# Patient Record
Sex: FEMALE | Race: WHITE | URBAN - METROPOLITAN AREA
[De-identification: names, ages, dates, MRNs, and addresses within clinical notes are randomized per-mention and may not be internally consistent; named-entity substitution may affect disease eponyms.]

---

## 2017-01-14 ENCOUNTER — ED HISTORICAL/CONVERTED ENCOUNTER (OUTPATIENT)
Dept: OTHER | Age: 22
End: 2017-01-14

## 2017-07-13 ENCOUNTER — OP HISTORICAL/CONVERTED ENCOUNTER (OUTPATIENT)
Dept: OTHER | Age: 22
End: 2017-07-13

## 2017-07-17 ENCOUNTER — OP HISTORICAL/CONVERTED ENCOUNTER (OUTPATIENT)
Dept: OTHER | Age: 22
End: 2017-07-17

## 2017-08-08 ENCOUNTER — OP HISTORICAL/CONVERTED ENCOUNTER (OUTPATIENT)
Dept: OTHER | Age: 22
End: 2017-08-08

## 2017-09-07 ENCOUNTER — IP HISTORICAL/CONVERTED ENCOUNTER (OUTPATIENT)
Dept: OTHER | Age: 22
End: 2017-09-07

## 2022-10-24 ENCOUNTER — HOSPITAL ENCOUNTER (EMERGENCY)
Age: 27
Discharge: HOME | End: 2022-10-24
Attending: EMERGENCY MEDICINE
Payer: MEDICAID

## 2022-10-24 ENCOUNTER — APPOINTMENT (OUTPATIENT)
Dept: GENERAL RADIOLOGY | Age: 27
End: 2022-10-24
Attending: EMERGENCY MEDICINE
Payer: MEDICAID

## 2022-10-24 VITALS
HEART RATE: 96 BPM | RESPIRATION RATE: 20 BRPM | TEMPERATURE: 98.4 F | HEIGHT: 66 IN | SYSTOLIC BLOOD PRESSURE: 127 MMHG | OXYGEN SATURATION: 96 % | DIASTOLIC BLOOD PRESSURE: 76 MMHG | BODY MASS INDEX: 38.57 KG/M2 | WEIGHT: 240 LBS

## 2022-10-24 DIAGNOSIS — J20.9 ACUTE BRONCHITIS, UNSPECIFIED ORGANISM: ICD-10-CM

## 2022-10-24 DIAGNOSIS — B34.9 VIRAL SYNDROME: Primary | ICD-10-CM

## 2022-10-24 PROCEDURE — 99283 EMERGENCY DEPT VISIT LOW MDM: CPT

## 2022-10-24 PROCEDURE — 74011250637 HC RX REV CODE- 250/637: Performed by: EMERGENCY MEDICINE

## 2022-10-24 PROCEDURE — 71045 X-RAY EXAM CHEST 1 VIEW: CPT

## 2022-10-24 RX ORDER — ACETAMINOPHEN AND CODEINE PHOSPHATE 300; 30 MG/1; MG/1
1 TABLET ORAL ONCE
Status: COMPLETED | OUTPATIENT
Start: 2022-10-24 | End: 2022-10-24

## 2022-10-24 RX ORDER — ONDANSETRON 4 MG/1
4 TABLET, ORALLY DISINTEGRATING ORAL
Qty: 7 TABLET | Refills: 0 | Status: SHIPPED | OUTPATIENT
Start: 2022-10-24

## 2022-10-24 RX ORDER — CODEINE PHOSPHATE AND GUAIFENESIN 10; 100 MG/5ML; MG/5ML
5 SOLUTION ORAL
Qty: 118 ML | Refills: 0 | Status: SHIPPED | OUTPATIENT
Start: 2022-10-24 | End: 2022-10-27

## 2022-10-24 RX ORDER — ALBUTEROL SULFATE 0.83 MG/ML
SOLUTION RESPIRATORY (INHALATION)
COMMUNITY

## 2022-10-24 RX ORDER — NORETHINDRONE ACETATE/ETHINYL ESTRADIOL AND FERROUS FUMARATE 1MG-20(21)
1 KIT ORAL DAILY
COMMUNITY
Start: 2022-09-01

## 2022-10-24 RX ORDER — ALBUTEROL SULFATE 2.5 MG/.5ML
SOLUTION RESPIRATORY (INHALATION) ONCE
COMMUNITY

## 2022-10-24 RX ADMIN — ACETAMINOPHEN AND CODEINE PHOSPHATE 1 TABLET: 300; 30 TABLET ORAL at 22:25

## 2022-10-25 NOTE — DISCHARGE INSTRUCTIONS
You were seen in the ER for your COVID-like symptoms. You should act as though you are positive even if your swab returns negative. Thankfully, your vital signs are all normal, including your oxygen and your heart rate. You should take tylenol or ibuprofen for fever, aches and pains for the next few days. You can alternate these every 3 hours so that you always have something on board. For instance, you can take tylenol at 9am, ibuprofen at noon, tylenol again at 3pm and ibuprofen again at 6pm. Take in plenty of water to stay hydrated and follow up with your primary care doctor in the next few days. Return to the ER for any uncontrollable vomiting or diarrhea, difficulty breathing, or any other new or concerning symptoms. Thank you! Thank you for allowing me to care for you in the emergency department. It is my goal to provide you with excellent care. If you have not received excellent quality care, please ask to speak to the nurse manager. Please fill out the survey that will come to you by mail or email since we listen to your feedback! Below you will find a list of your tests from today's visit. Should you have any questions, please do not hesitate to call the emergency department. Labs  No results found for this or any previous visit (from the past 12 hour(s)). Radiologic Studies  XR CHEST PORT    (Results Pending)     CT Results  (Last 48 hours)      None          CXR Results  (Last 48 hours)      None          ------------------------------------------------------------------------------------------------------------  The exam and treatment you received in the Emergency Department were for an urgent problem and are not intended as complete care. It is important that you follow-up with a doctor, nurse practitioner, or physician assistant to:  (1) confirm your diagnosis,  (2) re-evaluation of changes in your illness and treatment, and  (3) for ongoing care.  Please take your discharge instructions with you when you go to your follow-up appointment. If you have any problem arranging a follow-up appointment, contact the Emergency Department. If your symptoms become worse or you do not improve as expected and you are unable to reach your health care provider, please return to the Emergency Department. We are available 24 hours a day. If a prescription has been provided, please have it filled as soon as possible to prevent a delay in treatment. If you have any questions or reservations about taking the medication due to side effects or interactions with other medications, please call your primary care provider or contact the ER.

## 2022-10-25 NOTE — ED PROVIDER NOTES
EMERGENCY DEPARTMENT HISTORY AND PHYSICAL EXAM      Date: 10/24/2022  Patient Name: Sb Viera      History of Presenting Illness     Chief Complaint   Patient presents with    Shortness of Breath    Nausea    Rash     facial       History Provided By: Patient    HPI: Sb Viera, 33 y.o. female with a past medical history significant for Asthma/Bronchitis presents to the ED with cc of SOB, N/V/D. Onset yesterday, multiple episodes NBNB emesis, watery diarrhea. This AM noted petechiae to face and coughing, feeling SOB. Denies F/C, no known sick contacts. Denies abd pain. Took negative pregnancy test this AM and is on birth control. There are no other complaints, changes, or physical findings at this time. PCP: No primary care provider on file. Current Outpatient Medications   Medication Sig Dispense Refill    Pam Fe 1/20, 28, 1 mg-20 mcg (21)/75 mg (7) tab Take 1 Tablet by mouth daily. albuterol sulfate (PROVENTIL;VENTOLIN) 2.5 mg/0.5 mL nebu nebulizer solution by Nebulization route once. albuterol (PROVENTIL VENTOLIN) 2.5 mg /3 mL (0.083 %) nebu by Nebulization route. guaiFENesin-codeine (ROBITUSSIN AC) 100-10 mg/5 mL solution Take 5 mL by mouth nightly as needed for Cough for up to 3 days. Max Daily Amount: 5 mL. 118 mL 0    ondansetron (ZOFRAN ODT) 4 mg disintegrating tablet Take 1 Tablet by mouth every eight (8) hours as needed for Nausea or Vomiting. 7 Tablet 0       Past History     Past Medical History:  Past Medical History:   Diagnosis Date    Bronchitis        Past Surgical History:  History reviewed. No pertinent surgical history. Family History:  History reviewed. No pertinent family history.     Social History:  Social History     Tobacco Use    Smoking status: Every Day     Packs/day: 0.25     Types: Cigarettes    Smokeless tobacco: Current   Substance Use Topics    Alcohol use: Yes     Comment: occ    Drug use: Yes     Types: Marijuana     Comment: Daily Allergies: Allergies   Allergen Reactions    Amoxicillin Anaphylaxis    Pcn [Penicillins] Anaphylaxis         Review of Systems   Constitutional: Negative except as in HPI. Eyes: Negative except as in HPI.  ENT: Negative except as in HPI. Cardiovascular: Negative except as in HPI. Respiratory: Negative except as in HPI. Gastrointestinal: Negative except as in HPI. Genitourinary: Negative except as in HPI. Musculoskeletal: Negative except as in HPI. Integumentary: Negative except as in HPI. Neurological: Negative except as in HPI. Psychiatric: Negative except as in HPI. Endocrine: Negative except as in HPI. Hematologic/Lymphatic: Negative except as in HPI. Allergic/Immunologic: Negative except as in HPI. Physical Exam   Constitutional: Awake and alert, interactive, NAD  Eyes: PERRL, no injection or scleral icterus, no discharge  HEENT: NCAT, neck supple, MMM, no oropharyngeal exudates  CV: RRR, no m/r/g  Respiratory: CTAB, no r/r/w  GI: Abd soft, nondistended, nontender  : Deferred  MSK: FROM, no joint effusions or edema  Skin: Petechiae on face  Neuro: CN2-12 intact, symmetric facies, fluent speech. Psych: Well-groomed, normal speech, behavior, appropriate mood    Lab and Diagnostic Study Results     Labs -   No results found for this or any previous visit (from the past 12 hour(s)). Radiologic Studies -   [unfilled]  CT Results  (Last 48 hours)      None          CXR Results  (Last 48 hours)                 10/24/22 2204  XR CHEST PORT Final result    Impression:  Normal chest.       Narrative:  EXAM: XR CHEST PORT       INDICATION: Shortness of breath       COMPARISON: None. FINDINGS: A portable AP radiograph of the chest was obtained at 2200 hours. The   lungs are clear. The cardiac and mediastinal contours and pulmonary vascularity   are normal.  The bones and soft tissues are grossly within normal limits.                     Medical Decision Making and ED Course   - I am the first and primary provider for this patient AND AM THE PRIMARY PROVIDER OF RECORD. - I reviewed the vital signs, available nursing notes, past medical history, past surgical history, family history and social history. - Initial assessment performed. The patients presenting problems have been discussed, and the staff are in agreement with the care plan formulated and outlined with them. I have encouraged them to ask questions as they arise throughout their visit. Vital Signs-Reviewed the patient's vital signs. Patient Vitals for the past 12 hrs:   Temp Pulse Resp BP SpO2   10/24/22 2130 98.4 °F (36.9 °C) 96 20 127/76 96 %       EKG interpretation:         Provider Notes (Medical Decision Making):   27F w/viral syndrome causing gastroenteritis and bronchitis, will give codeine for sleep given cough most prevalent sx currently, zofran PRN for N/V. Diarrhea improving, vitals reassuring. CXR no PNA on my read, will discharge with return precautions. ED Course:       ED Course as of 10/24/22 2225   Mon Oct 24, 2022   2225 XR CHEST PORT  FINDINGS: A portable AP radiograph of the chest was obtained at 2200 hours. The  lungs are clear. The cardiac and mediastinal contours and pulmonary vascularity  are normal.  The bones and soft tissues are grossly within normal limits. IMPRESSION  Normal chest. [YA]      ED Course User Index  [YA] Aakash Mendez MD         Disposition     Disposition: DC- Adult Discharges: All of the diagnostic tests were reviewed and questions answered. Diagnosis, care plan and treatment options were discussed. The patient understands the instructions and will follow up as directed. The patients results have been reviewed with them. They have been counseled regarding their diagnosis.   The patient verbally convey understanding and agreement of the signs, symptoms, diagnosis, treatment and prognosis and additionally agrees to follow up as recommended with their PCP in 24 - 48 hours.  They also agree with the care-plan and convey that all of their questions have been answered. I have also put together some discharge instructions for them that include: 1) educational information regarding their diagnosis, 2) how to care for their diagnosis at home, as well a 3) list of reasons why they would want to return to the ED prior to their follow-up appointment, should their condition change. Discharged      Diagnosis     Clinical Impression:   1. Viral syndrome    2. Acute bronchitis, unspecified organism        Attestations:     Maged Thorpe MD

## 2022-10-25 NOTE — ED TRIAGE NOTES
Patient presents with SOB, nausea. Believes it to be bronchitis. Inhaler and neb used today. No relief.

## 2022-12-14 ENCOUNTER — HOSPITAL ENCOUNTER (EMERGENCY)
Age: 27
Discharge: HOME | End: 2022-12-14
Attending: EMERGENCY MEDICINE | Admitting: EMERGENCY MEDICINE
Payer: MEDICAID

## 2022-12-14 VITALS
WEIGHT: 241 LBS | RESPIRATION RATE: 18 BRPM | SYSTOLIC BLOOD PRESSURE: 121 MMHG | OXYGEN SATURATION: 98 % | TEMPERATURE: 98.2 F | BODY MASS INDEX: 38.73 KG/M2 | HEART RATE: 98 BPM | HEIGHT: 66 IN | DIASTOLIC BLOOD PRESSURE: 78 MMHG

## 2022-12-14 DIAGNOSIS — J42 CHRONIC BRONCHITIS, UNSPECIFIED CHRONIC BRONCHITIS TYPE (HCC): ICD-10-CM

## 2022-12-14 DIAGNOSIS — H92.01 RIGHT EAR PAIN: ICD-10-CM

## 2022-12-14 DIAGNOSIS — R05.3 CHRONIC COUGHING: Primary | ICD-10-CM

## 2022-12-14 PROCEDURE — 99283 EMERGENCY DEPT VISIT LOW MDM: CPT

## 2022-12-14 RX ORDER — METHYLPREDNISOLONE 4 MG/1
TABLET ORAL
Qty: 1 DOSE PACK | Refills: 0 | Status: SHIPPED | OUTPATIENT
Start: 2022-12-14

## 2022-12-14 RX ORDER — ALBUTEROL SULFATE 90 UG/1
2 AEROSOL, METERED RESPIRATORY (INHALATION)
Qty: 1 EACH | Refills: 0 | Status: SHIPPED | OUTPATIENT
Start: 2022-12-14 | End: 2023-01-13

## 2022-12-14 RX ORDER — AZITHROMYCIN 250 MG/1
TABLET, FILM COATED ORAL
Qty: 6 TABLET | Refills: 0 | Status: SHIPPED | OUTPATIENT
Start: 2022-12-14 | End: 2022-12-19

## 2022-12-14 RX ORDER — FLUTICASONE PROPIONATE 50 MCG
2 SPRAY, SUSPENSION (ML) NASAL
Qty: 1 EACH | Refills: 0 | Status: SHIPPED | OUTPATIENT
Start: 2022-12-14

## 2022-12-14 RX ORDER — ALBUTEROL SULFATE 0.83 MG/ML
2.5 SOLUTION RESPIRATORY (INHALATION)
Qty: 1 EACH | Refills: 0 | Status: SHIPPED | OUTPATIENT
Start: 2022-12-14 | End: 2023-01-13

## 2022-12-14 NOTE — ED TRIAGE NOTES
Pt was seen here about a month ago for the same issue, pt states she's still having a cough and believes she has an ear infection due to right ear pain. Pt states she uses an albuterol inhaler with no relief. Pt had a negative covid test on 12/5/22.

## 2022-12-14 NOTE — ED PROVIDER NOTES
EMERGENCY DEPARTMENT HISTORY AND PHYSICAL EXAM      Date: 12/14/2022  Patient Name: Nilesh Stinson    History of Presenting Illness     Chief Complaint   Patient presents with    Cough    Ear Pain       History Provided By: Patient    HPI: Nilesh Stinson, 33 y.o. female presents to the ED with CC of coughing since October. Patient notes cough is worse at night preventing her from sleeping more than several minutes at a stretch. Cough is nonproductive. She denies fevers chills. She does have nasal congestion and some postnasal drip. She denies recent fevers. She had negative COVID test on 12/5/2022. Cough is worse at night. She does cough so hard at times that she throws up mucus. She otherwise denies myalgias, abdominal pain, nausea/vomiting/diarrhea. She denies dysuria, vaginal discharge. She used her albuterol inhaler and nebulizer which extends her periods of cough free times but overall symptoms have not improved. She was seen late October and put on steroids at that time which she believes helped her symptoms. Denies pregnancy. Patient denies SOB, chest pain, or any neurological symptoms. There are no other complaints, changes, or physical findings at this time. Past History     Past Medical History:  Past Medical History:   Diagnosis Date    Bronchitis        Allergies: Allergies   Allergen Reactions    Amoxicillin Anaphylaxis    Pcn [Penicillins] Anaphylaxis       Review of Systems   Vital signs and nursing notes reviewed  Review of Systems   Constitutional:  Negative for activity change, appetite change, fatigue and fever. HENT:  Positive for congestion, postnasal drip and rhinorrhea. Negative for sore throat. Respiratory:  Positive for cough and shortness of breath. Negative for wheezing. Cardiovascular: Negative. Negative for chest pain. Gastrointestinal: Negative. Negative for abdominal pain, diarrhea, nausea and vomiting. Endocrine: Negative.     Genitourinary: Negative for difficulty urinating and dysuria. Musculoskeletal:  Negative for arthralgias, joint swelling and myalgias. Skin: Negative. Negative for rash. Neurological: Negative. Negative for dizziness, weakness, light-headedness and headaches. Psychiatric/Behavioral: Negative. Physical Exam   Visit Vitals  /78 (BP 1 Location: Left upper arm, BP Patient Position: Sitting)   Pulse 98   Temp 98.2 °F (36.8 °C)   Resp 18   Ht 5' 6\" (1.676 m)   Wt 109.3 kg (241 lb)   SpO2 98%   BMI 38.90 kg/m²     CONSTITUTIONAL: Alert, in no distress. Appears stated age. HEAD:  Normocephalic, atraumatic  EYES: EOM intact. No conjunctival injection or scleral icterus  Neck:  Supple. No meningismus  HENT: mild posterior pharynx erythema, nasal congestion, mild left maxillary sinus ttp  RESP: Normal with no work of breathing, speaking in full sentences. CV: Well perfused. NEURO: Alert with normal mentation, moving extremities spontaneously  PSYCH: Normal mood, normal affect      Medical Decision Making   MDM: Pt well appearing with chronic cough which could be allergy related vs chronic bronchitis vs viral syndrome and/or long covid. Pt has not had formal diagnosis of COVID but possible undiagnosed given length of symptoms could be a consideration. Non focal exam will not image today. Will provide steroid dose pack, z pack, and refills on albuterol for nebulizer and an inhaler. Discussed if symptoms persist after New Year's she should have a pulmonary follow-up. ED Course:   Initial assessment performed. The patients presenting problems have been discussed, and they are in agreement with the care plan formulated and outlined with them. I have encouraged them to ask questions as they arise throughout their visit. Critical Care Time: None    Disposition:  DISCHARGE NOTE:  The pt is ready for discharge.  The pt's signs, symptoms, diagnosis, and discharge instructions have been discussed and pt has conveyed their understanding. The pt is to follow up as recommended or return to ER should their symptoms worsen. Plan has been discussed and pt is in agreement. PLAN:  1. Current Discharge Medication List        CONTINUE these medications which have NOT CHANGED    Details   Pam Fe 1/20, 28, 1 mg-20 mcg (21)/75 mg (7) tab Take 1 Tablet by mouth daily. albuterol sulfate (PROVENTIL;VENTOLIN) 2.5 mg/0.5 mL nebu nebulizer solution by Nebulization route once. ondansetron (ZOFRAN ODT) 4 mg disintegrating tablet Take 1 Tablet by mouth every eight (8) hours as needed for Nausea or Vomiting. Qty: 7 Tablet, Refills: 0           2. Follow-up Information       Follow up With Specialties Details [facility] [address] PCP. IF SYMPTOMS PERSIST INTO THE NEW YEAR PLS MAKE AN APPOINTMENT WITH A PULMONOLOGIST              3. Drink plenty of fluids  4. Return to ED if worse especially if any worsening shortness of breath, chest pain or altered mentation. Diagnosis     Clinical Impression:   1. Chronic coughing    2. Right ear pain    3. Chronic bronchitis, unspecified chronic bronchitis type ([address]        Please note that this dictation was completed with [employer], the computer voice recognition software. Quite often unanticipated grammatical, syntax, homophones, and other interpretive errors are inadvertently transcribed by the computer software. Please disregards these errors. Please excuse any errors that have escaped final proofreading.

## 2023-05-25 RX ORDER — ONDANSETRON 4 MG/1
4 TABLET, ORALLY DISINTEGRATING ORAL EVERY 8 HOURS PRN
COMMUNITY
Start: 2022-10-24

## 2023-05-25 RX ORDER — FLUTICASONE PROPIONATE 50 MCG
2 SPRAY, SUSPENSION (ML) NASAL 2 TIMES DAILY PRN
COMMUNITY
Start: 2022-12-14

## 2023-05-25 RX ORDER — NORETHINDRONE ACETATE AND ETHINYL ESTRADIOL 1MG-20(21)
1 KIT ORAL DAILY
COMMUNITY
Start: 2022-09-01

## 2023-05-25 RX ORDER — METHYLPREDNISOLONE 4 MG/1
TABLET ORAL
COMMUNITY
Start: 2022-12-14

## 2024-03-31 ENCOUNTER — HOSPITAL ENCOUNTER (EMERGENCY)
Facility: HOSPITAL | Age: 29
Discharge: HOME | End: 2024-03-31
Payer: MEDICAID

## 2024-03-31 ENCOUNTER — APPOINTMENT (OUTPATIENT)
Facility: HOSPITAL | Age: 29
End: 2024-03-31
Payer: MEDICAID

## 2024-03-31 VITALS
BODY MASS INDEX: 39.37 KG/M2 | DIASTOLIC BLOOD PRESSURE: 58 MMHG | TEMPERATURE: 98.4 F | SYSTOLIC BLOOD PRESSURE: 96 MMHG | OXYGEN SATURATION: 99 % | HEIGHT: 66 IN | RESPIRATION RATE: 14 BRPM | WEIGHT: 245 LBS | HEART RATE: 85 BPM

## 2024-03-31 DIAGNOSIS — O46.90 VAGINAL BLEEDING IN PREGNANCY: Primary | ICD-10-CM

## 2024-03-31 LAB
ABO + RH BLD: NORMAL
ALBUMIN SERPL-MCNC: 3.8 G/DL (ref 3.5–5)
ALBUMIN/GLOB SERPL: 1.1 (ref 1.1–2.2)
ALP SERPL-CCNC: 82 U/L (ref 45–117)
ALT SERPL-CCNC: 23 U/L (ref 12–78)
ANION GAP SERPL CALC-SCNC: 4 MMOL/L (ref 5–15)
APPEARANCE UR: CLEAR
AST SERPL W P-5'-P-CCNC: 12 U/L (ref 15–37)
BACTERIA URNS QL MICRO: NEGATIVE /HPF
BASOPHILS # BLD: 0.1 K/UL (ref 0–0.1)
BASOPHILS NFR BLD: 1 % (ref 0–1)
BILIRUB SERPL-MCNC: 0.5 MG/DL (ref 0.2–1)
BILIRUB UR QL: NEGATIVE
BUN SERPL-MCNC: 10 MG/DL (ref 6–20)
BUN/CREAT SERPL: 15 (ref 12–20)
CA-I BLD-MCNC: 8.8 MG/DL (ref 8.5–10.1)
CHLORIDE SERPL-SCNC: 108 MMOL/L (ref 97–108)
CO2 SERPL-SCNC: 26 MMOL/L (ref 21–32)
COLOR UR: ABNORMAL
CREAT SERPL-MCNC: 0.68 MG/DL (ref 0.55–1.02)
DIFFERENTIAL METHOD BLD: ABNORMAL
EOSINOPHIL # BLD: 0.1 K/UL (ref 0–0.4)
EOSINOPHIL NFR BLD: 1 % (ref 0–7)
EPITH CASTS URNS QL MICRO: ABNORMAL /LPF
ERYTHROCYTE [DISTWIDTH] IN BLOOD BY AUTOMATED COUNT: 12.9 % (ref 11.5–14.5)
GLOBULIN SER CALC-MCNC: 3.5 G/DL (ref 2–4)
GLUCOSE SERPL-MCNC: 94 MG/DL (ref 65–100)
GLUCOSE UR STRIP.AUTO-MCNC: NEGATIVE MG/DL
HCG SERPL-ACNC: 2286 MIU/ML (ref 0–6)
HCG UR QL: POSITIVE
HCT VFR BLD AUTO: 41.9 % (ref 35–47)
HGB BLD-MCNC: 14.2 G/DL (ref 11.5–16)
HGB UR QL STRIP: ABNORMAL
IMM GRANULOCYTES # BLD AUTO: 0.1 K/UL (ref 0–0.04)
IMM GRANULOCYTES NFR BLD AUTO: 1 % (ref 0–0.5)
KETONES UR QL STRIP.AUTO: NEGATIVE MG/DL
LEUKOCYTE ESTERASE UR QL STRIP.AUTO: ABNORMAL
LYMPHOCYTES # BLD: 2.8 K/UL (ref 0.8–3.5)
LYMPHOCYTES NFR BLD: 27 % (ref 12–49)
MCH RBC QN AUTO: 28.1 PG (ref 26–34)
MCHC RBC AUTO-ENTMCNC: 33.9 G/DL (ref 30–36.5)
MCV RBC AUTO: 82.8 FL (ref 80–99)
MONOCYTES # BLD: 0.6 K/UL (ref 0–1)
MONOCYTES NFR BLD: 6 % (ref 5–13)
MUCOUS THREADS URNS QL MICRO: NEGATIVE /LPF
NEUTS SEG # BLD: 6.9 K/UL (ref 1.8–8)
NEUTS SEG NFR BLD: 64 % (ref 32–75)
NITRITE UR QL STRIP.AUTO: NEGATIVE
NRBC # BLD: 0 K/UL (ref 0–0.01)
NRBC BLD-RTO: 0 PER 100 WBC
PH UR STRIP: 5 (ref 5–8)
PLATELET # BLD AUTO: 298 K/UL (ref 150–400)
PMV BLD AUTO: 9.6 FL (ref 8.9–12.9)
POTASSIUM SERPL-SCNC: 3.8 MMOL/L (ref 3.5–5.1)
PROT SERPL-MCNC: 7.3 G/DL (ref 6.4–8.2)
PROT UR STRIP-MCNC: NEGATIVE MG/DL
RBC # BLD AUTO: 5.06 M/UL (ref 3.8–5.2)
RBC #/AREA URNS HPF: ABNORMAL /HPF (ref 0–5)
SODIUM SERPL-SCNC: 138 MMOL/L (ref 136–145)
SP GR UR REFRACTOMETRY: 1.02 (ref 1–1.03)
URINE CULTURE IF INDICATED: ABNORMAL
UROBILINOGEN UR QL STRIP.AUTO: 0.1 EU/DL (ref 0.1–1)
WBC # BLD AUTO: 10.5 K/UL (ref 3.6–11)
WBC URNS QL MICRO: ABNORMAL /HPF (ref 0–4)

## 2024-03-31 PROCEDURE — 6360000002 HC RX W HCPCS: Performed by: NURSE PRACTITIONER

## 2024-03-31 PROCEDURE — 96372 THER/PROPH/DIAG INJ SC/IM: CPT

## 2024-03-31 PROCEDURE — 81001 URINALYSIS AUTO W/SCOPE: CPT

## 2024-03-31 PROCEDURE — 80053 COMPREHEN METABOLIC PANEL: CPT

## 2024-03-31 PROCEDURE — 84702 CHORIONIC GONADOTROPIN TEST: CPT

## 2024-03-31 PROCEDURE — 81025 URINE PREGNANCY TEST: CPT

## 2024-03-31 PROCEDURE — 86901 BLOOD TYPING SEROLOGIC RH(D): CPT

## 2024-03-31 PROCEDURE — 76817 TRANSVAGINAL US OBSTETRIC: CPT

## 2024-03-31 PROCEDURE — 85025 COMPLETE CBC W/AUTO DIFF WBC: CPT

## 2024-03-31 PROCEDURE — 99284 EMERGENCY DEPT VISIT MOD MDM: CPT

## 2024-03-31 PROCEDURE — 86900 BLOOD TYPING SEROLOGIC ABO: CPT

## 2024-03-31 PROCEDURE — 36415 COLL VENOUS BLD VENIPUNCTURE: CPT

## 2024-03-31 RX ADMIN — HUMAN RHO(D) IMMUNE GLOBULIN 300 MCG: 1500 SOLUTION INTRAMUSCULAR; INTRAVENOUS at 17:32

## 2024-03-31 ASSESSMENT — PAIN SCALES - GENERAL
PAINLEVEL_OUTOF10: 0
PAINLEVEL_OUTOF10: 0

## 2024-03-31 ASSESSMENT — PAIN - FUNCTIONAL ASSESSMENT: PAIN_FUNCTIONAL_ASSESSMENT: 0-10

## 2024-03-31 ASSESSMENT — LIFESTYLE VARIABLES
HOW OFTEN DO YOU HAVE A DRINK CONTAINING ALCOHOL: NEVER
HOW MANY STANDARD DRINKS CONTAINING ALCOHOL DO YOU HAVE ON A TYPICAL DAY: PATIENT DOES NOT DRINK

## 2024-03-31 NOTE — ED PROVIDER NOTES
WALESKA Jaime - NP (electronically signed)    (Please note that parts of this dictation were completed with voice recognition software. Quite often unanticipated grammatical, syntax, homophones, and other interpretive errors are inadvertently transcribed by the computer software. Please disregards these errors. Please excuse any errors that have escaped final proofreading.)     Catarino Wright, WALESKA - NP  04/01/24 [id number]

## 2024-04-01 LAB
BLD PROD TYP BPU: NORMAL
BLOOD BANK BLOOD PRODUCT EXPIRATION DATE: NORMAL
BLOOD BANK DISPENSE STATUS: NORMAL
BPU ID: NORMAL
TRANSFUSION STATUS PATIENT QL: NORMAL
UNIT DIVISION: 0
UNIT ISSUE DATE/TIME: NORMAL

## 2024-04-17 LAB
ABO, EXTERNAL RESULT: NORMAL
C. TRACHOMATIS, EXTERNAL RESULT: NEGATIVE
HEP B, EXTERNAL RESULT: NEGATIVE
HIV, EXTERNAL RESULT: NEGATIVE
N. GONORRHOEAE, EXTERNAL RESULT: NEGATIVE
RH FACTOR, EXTERNAL RESULT: NEGATIVE
RPR, EXTERNAL RESULT: NON REACTIVE
RUBELLA TITER, EXTERNAL RESULT: NORMAL

## 2024-07-18 ENCOUNTER — TELEPHONE (OUTPATIENT)
Age: 29
End: 2024-07-18

## 2024-07-18 NOTE — TELEPHONE ENCOUNTER
Spoke w/ patient and confirmed name and     Scheduled patient per referral for 24 @ 1:00 [facility]    Advised patient to arrive 10 min early to fill out paperwork, bring insurance card and photo ID. Informed patient that she may bring two guest over the age of 12.

## 2024-07-30 ENCOUNTER — ROUTINE PRENATAL (OUTPATIENT)
Age: 29
End: 2024-07-30
Payer: MEDICAID

## 2024-07-30 VITALS — HEART RATE: 99 BPM | SYSTOLIC BLOOD PRESSURE: 119 MMHG | DIASTOLIC BLOOD PRESSURE: 79 MMHG

## 2024-07-30 DIAGNOSIS — O99.210 OBESITY IN PREGNANCY: Primary | ICD-10-CM

## 2024-07-30 PROCEDURE — 76811 OB US DETAILED SNGL FETUS: CPT | Performed by: OBSTETRICS & GYNECOLOGY

## 2024-07-30 PROCEDURE — 99203 OFFICE O/P NEW LOW 30 MIN: CPT | Performed by: OBSTETRICS & GYNECOLOGY

## 2024-07-30 NOTE — PROCEDURES
PATIENT: CALIXTO CABEZAS   -  : 1995   -  DOS:2024   -  INTERPRETING PROVIDER:Zohra Galvan,   Indication  ========    Obesity in pregnancy    Method  ======    Transabdominal ultrasound examination. View: Suboptimal view: limited by maternal body habitus    Dating  ======    LMP on: 2024  GA by LMP 25 w + 6 d  CHARLENE by LMP: 2024  Previous Ultrasound on: 2024  Type of prior assessment: GA  GA at prior assessment date 7 w + 1 d  GA by previous U/S 22 w + 0 d  CHARLENE by previous Ultrasound: 12/3/2024  Ultrasound examination on: 2024  GA by U/S based upon: AC, BPD, Femur, HC  GA by U/S 22 w + 0 d  CHARLENE by U/S: 12/3/2024  Assigned: based on ultrasound (GA), selected on 2024  Assigned GA 22 w + 0 d  Assigned CHARLENE: 12/3/2024    Fetal Growth Overview  =================    Exam date        GA              BPD (mm)          HC (mm)              AC (mm)               FL (mm)             HL (mm)            EFW (g)  2024        22w 0d        51.1     27%        190.3    14%        175.4     57%        39.4    63%        38.7     94%        500    64%    Fetal Biometry  ============    Standard  BPD 51.1 mm 21w 3d 27% Hadlock  OFD 68.0 mm 22w 5d 76% Sterling  .3 mm 21w 2d 14% Hadlock  Cerebellum tr 24.6 mm 22w 3d 86% Hill  .4 mm 22w 3d 57% Hadlock  Femur 39.4 mm 22w 5d 63% Hadlock  Humerus 38.7 mm 23w 5d 94% Sterling   g 22w 2d 64% Hadlock  EFW (lb) 1 lb  EFW (oz) 2 oz  EFW by: Hadlock (BPD-HC-AC-FL)  Extended   4.3 mm  CM 4.4 mm  19% Nicolaides  Head / Face / Neck  Nasal bone: present  Other Structures   bpm    General Evaluation  ==============    Cardiac activity present.  bpm. Fetal movements: visualized. Presentation: Cephalic  Placenta: Placental site: posterior, appropriate distance from the internal os. Placental edge-to-cervical os distance 4.5 cm  Umbilical cord: Cord vessels: 3 vessel cord. Insertion site: central  Amniotic fluid: Amount of AF:

## 2024-08-28 ENCOUNTER — ROUTINE PRENATAL (OUTPATIENT)
Age: 29
End: 2024-08-28
Payer: MEDICAID

## 2024-08-28 VITALS — DIASTOLIC BLOOD PRESSURE: 77 MMHG | SYSTOLIC BLOOD PRESSURE: 125 MMHG | HEART RATE: 106 BPM

## 2024-08-28 DIAGNOSIS — O99.210 OBESITY IN PREGNANCY: Primary | ICD-10-CM

## 2024-08-28 PROCEDURE — 99212 OFFICE O/P EST SF 10 MIN: CPT | Performed by: OBSTETRICS & GYNECOLOGY

## 2024-08-28 PROCEDURE — 76816 OB US FOLLOW-UP PER FETUS: CPT | Performed by: OBSTETRICS & GYNECOLOGY

## 2024-08-29 NOTE — PROCEDURES
PATIENT: CALIXTO CABEZAS   -  : 1995   -  DOS:2024   -  INTERPRETING PROVIDER:Zohra Galvan,   Indication  ========    Obesity in pregnancy    Method  ======    Transabdominal ultrasound examination. View: Suboptimal view: limited by maternal body habitus    Pregnancy  =========    Aleman pregnancy. Number of fetuses: 1    Dating  ======    LMP on: 2024  GA by LMP 30 w + 0 d  CHARLENE by LMP: 2024  Previous Ultrasound on: 2024  Type of prior assessment: GA  GA at prior assessment date 7 w + 1 d  GA by previous U/S 26 w + 1 d  CHARLENE by previous Ultrasound: 12/3/2024  Ultrasound examination on: 2024  GA by U/S based upon: AC, BPD, Femur, HC  GA by U/S 25 w + 6 d  CHARLENE by U/S: 2024  Assigned: based on ultrasound (GA), selected on 2024  Assigned GA 26 w + 1 d  Assigned CHARLENE: 12/3/2024    Fetal Biometry  ============    Standard  BPD 65.2 mm 26w 2d 46% Hadlock  OFD 81.7 mm 26w 4d 62% Sterling  .4 mm 25w 4d 11% Hadlock  Cerebellum tr 28.7 mm 25w 2d 26% Hill  .5 mm 25w 4d 25% Hadlock  Femur 47.2 mm 25w 5d 24% Hadlock   g 25w 3d 23% Hadlock  EFW (lb) 1 lb  EFW (oz) 14 oz  EFW by: Hadlock (BPD-HC-AC-FL)  Extended  Nasal bone 8.8 mm  Head / Face / Neck  Nasal bone: present  Other Structures   bpm    General Evaluation  ==============    Cardiac activity present.  bpm. Fetal movements: visualized. Presentation: Cephalic  Placenta: Placental site: posterior, appropriate distance from the internal os  Umbilical cord: Cord vessels: 3 vessel cord. Insertion site: central  Amniotic fluid: Amount of AF: normal    Fetal Anatomy  ===========    Cranium: normal  Cavum septi pellucidi: normal  Cerebellum: normal  Profile: normal  Face  Nasal bone: present  4-chamber view: SUBOPTIMAL  RVOT view: normal  LVOT view: NOT VISUALIZED  3-vessel view: NOT VISUALIZED  3-vessel-trachea view: NOT VISUALIZED  Heart / Thorax  Situs: situs solitus (normal)  Aortic arch

## 2024-09-06 ENCOUNTER — HOSPITAL ENCOUNTER (OUTPATIENT)
Facility: HOSPITAL | Age: 29
Setting detail: OBSERVATION
Discharge: HOME | End: 2024-09-07
Attending: STUDENT IN AN ORGANIZED HEALTH CARE EDUCATION/TRAINING PROGRAM | Admitting: OBSTETRICS & GYNECOLOGY
Payer: MEDICAID

## 2024-09-06 ENCOUNTER — HOSPITAL ENCOUNTER (OUTPATIENT)
Facility: HOSPITAL | Age: 29
Discharge: HOME | End: 2024-09-06
Attending: OBSTETRICS & GYNECOLOGY | Admitting: OBSTETRICS & GYNECOLOGY
Payer: MEDICAID

## 2024-09-06 VITALS
HEIGHT: 66 IN | WEIGHT: 251 LBS | TEMPERATURE: 98 F | BODY MASS INDEX: 40.34 KG/M2 | HEART RATE: 102 BPM | SYSTOLIC BLOOD PRESSURE: 117 MMHG | DIASTOLIC BLOOD PRESSURE: 67 MMHG | OXYGEN SATURATION: 99 % | RESPIRATION RATE: 20 BRPM

## 2024-09-06 PROBLEM — R10.11 RIGHT UPPER QUADRANT PAIN: Status: ACTIVE | Noted: 2024-09-06

## 2024-09-06 PROBLEM — Z3A.27 27 WEEKS GESTATION OF PREGNANCY: Status: ACTIVE | Noted: 2024-09-06

## 2024-09-06 LAB
ALBUMIN SERPL-MCNC: 2.9 G/DL (ref 3.5–5)
ALBUMIN/GLOB SERPL: 0.9 (ref 1.1–2.2)
ALP SERPL-CCNC: 96 U/L (ref 45–117)
ALT SERPL-CCNC: 16 U/L (ref 12–78)
ANION GAP SERPL CALC-SCNC: 9 MMOL/L (ref 2–12)
APPEARANCE UR: CLEAR
AST SERPL-CCNC: 13 U/L (ref 15–37)
BACTERIA URNS QL MICRO: NEGATIVE /HPF
BASOPHILS # BLD: 0 K/UL (ref 0–0.1)
BASOPHILS NFR BLD: 0 % (ref 0–1)
BILIRUB SERPL-MCNC: 0.5 MG/DL (ref 0.2–1)
BILIRUB UR QL: NEGATIVE
BUN SERPL-MCNC: 5 MG/DL (ref 6–20)
BUN/CREAT SERPL: 11 (ref 12–20)
CALCIUM SERPL-MCNC: 8.4 MG/DL (ref 8.5–10.1)
CHLORIDE SERPL-SCNC: 107 MMOL/L (ref 97–108)
CO2 SERPL-SCNC: 21 MMOL/L (ref 21–32)
COLOR UR: ABNORMAL
CREAT SERPL-MCNC: 0.44 MG/DL (ref 0.55–1.02)
CREAT UR-MCNC: 200 MG/DL
DIFFERENTIAL METHOD BLD: ABNORMAL
EOSINOPHIL # BLD: 0 K/UL (ref 0–0.4)
EOSINOPHIL NFR BLD: 0 % (ref 0–7)
EPITH CASTS URNS QL MICRO: ABNORMAL /LPF
ERYTHROCYTE [DISTWIDTH] IN BLOOD BY AUTOMATED COUNT: 13.2 % (ref 11.5–14.5)
GLOBULIN SER CALC-MCNC: 3.3 G/DL (ref 2–4)
GLUCOSE SERPL-MCNC: 98 MG/DL (ref 65–100)
GLUCOSE UR STRIP.AUTO-MCNC: NEGATIVE MG/DL
HCT VFR BLD AUTO: 36.1 % (ref 35–47)
HGB BLD-MCNC: 12.4 G/DL (ref 11.5–16)
HGB UR QL STRIP: NEGATIVE
IMM GRANULOCYTES # BLD AUTO: 0.1 K/UL (ref 0–0.04)
IMM GRANULOCYTES NFR BLD AUTO: 1 % (ref 0–0.5)
KETONES UR QL STRIP.AUTO: NEGATIVE MG/DL
LEUKOCYTE ESTERASE UR QL STRIP.AUTO: NEGATIVE
LIPASE SERPL-CCNC: 26 U/L (ref 13–75)
LYMPHOCYTES # BLD: 0.7 K/UL (ref 0.8–3.5)
LYMPHOCYTES NFR BLD: 5 % (ref 12–49)
MCH RBC QN AUTO: 28.8 PG (ref 26–34)
MCHC RBC AUTO-ENTMCNC: 34.3 G/DL (ref 30–36.5)
MCV RBC AUTO: 83.8 FL (ref 80–99)
MONOCYTES # BLD: 0.6 K/UL (ref 0–1)
MONOCYTES NFR BLD: 4 % (ref 5–13)
NEUTS SEG # BLD: 13.4 K/UL (ref 1.8–8)
NEUTS SEG NFR BLD: 90 % (ref 32–75)
NITRITE UR QL STRIP.AUTO: NEGATIVE
NRBC # BLD: 0 K/UL (ref 0–0.01)
NRBC BLD-RTO: 0 PER 100 WBC
PH UR STRIP: 8 (ref 5–8)
PLATELET # BLD AUTO: 256 K/UL (ref 150–400)
PMV BLD AUTO: 9.9 FL (ref 8.9–12.9)
POTASSIUM SERPL-SCNC: 3.6 MMOL/L (ref 3.5–5.1)
PROT SERPL-MCNC: 6.2 G/DL (ref 6.4–8.2)
PROT UR STRIP-MCNC: NEGATIVE MG/DL
PROT UR-MCNC: 36 MG/DL (ref 0–11.9)
PROT/CREAT UR-RTO: 0.2
RBC # BLD AUTO: 4.31 M/UL (ref 3.8–5.2)
RBC #/AREA URNS HPF: ABNORMAL /HPF (ref 0–5)
RBC MORPH BLD: ABNORMAL
SODIUM SERPL-SCNC: 137 MMOL/L (ref 136–145)
SP GR UR REFRACTOMETRY: 1.01 (ref 1–1.03)
URINE CULTURE IF INDICATED: ABNORMAL
UROBILINOGEN UR QL STRIP.AUTO: 0.1 EU/DL (ref 0.1–1)
WBC # BLD AUTO: 14.8 K/UL (ref 3.6–11)
WBC URNS QL MICRO: ABNORMAL /HPF (ref 0–4)

## 2024-09-06 PROCEDURE — 36415 COLL VENOUS BLD VENIPUNCTURE: CPT

## 2024-09-06 PROCEDURE — 85025 COMPLETE CBC W/AUTO DIFF WBC: CPT

## 2024-09-06 PROCEDURE — 84156 ASSAY OF PROTEIN URINE: CPT

## 2024-09-06 PROCEDURE — 96361 HYDRATE IV INFUSION ADD-ON: CPT

## 2024-09-06 PROCEDURE — 6370000000 HC RX 637 (ALT 250 FOR IP): Performed by: OBSTETRICS & GYNECOLOGY

## 2024-09-06 PROCEDURE — 82570 ASSAY OF URINE CREATININE: CPT

## 2024-09-06 PROCEDURE — 4500000002 HC ER NO CHARGE

## 2024-09-06 PROCEDURE — 96374 THER/PROPH/DIAG INJ IV PUSH: CPT

## 2024-09-06 PROCEDURE — 6360000002 HC RX W HCPCS: Performed by: OBSTETRICS & GYNECOLOGY

## 2024-09-06 PROCEDURE — G0379 DIRECT REFER HOSPITAL OBSERV: HCPCS

## 2024-09-06 PROCEDURE — 83690 ASSAY OF LIPASE: CPT

## 2024-09-06 PROCEDURE — 80053 COMPREHEN METABOLIC PANEL: CPT

## 2024-09-06 PROCEDURE — G0378 HOSPITAL OBSERVATION PER HR: HCPCS

## 2024-09-06 PROCEDURE — 81001 URINALYSIS AUTO W/SCOPE: CPT

## 2024-09-06 PROCEDURE — 2580000003 HC RX 258: Performed by: OBSTETRICS & GYNECOLOGY

## 2024-09-06 PROCEDURE — 99214 OFFICE O/P EST MOD 30 MIN: CPT

## 2024-09-06 RX ORDER — ONDANSETRON 2 MG/ML
4 INJECTION INTRAMUSCULAR; INTRAVENOUS EVERY 6 HOURS PRN
Status: DISCONTINUED | OUTPATIENT
Start: 2024-09-06 | End: 2024-09-07 | Stop reason: HOSPADM

## 2024-09-06 RX ORDER — SODIUM CHLORIDE, SODIUM LACTATE, POTASSIUM CHLORIDE, CALCIUM CHLORIDE 600; 310; 30; 20 MG/100ML; MG/100ML; MG/100ML; MG/100ML
INJECTION, SOLUTION INTRAVENOUS CONTINUOUS
Status: DISCONTINUED | OUTPATIENT
Start: 2024-09-06 | End: 2024-09-07 | Stop reason: HOSPADM

## 2024-09-06 RX ORDER — ONDANSETRON 4 MG/1
4 TABLET, ORALLY DISINTEGRATING ORAL EVERY 8 HOURS PRN
Status: DISCONTINUED | OUTPATIENT
Start: 2024-09-06 | End: 2024-09-07 | Stop reason: HOSPADM

## 2024-09-06 RX ORDER — OXYCODONE HYDROCHLORIDE 5 MG/1
10 TABLET ORAL EVERY 4 HOURS PRN
Status: DISCONTINUED | OUTPATIENT
Start: 2024-09-06 | End: 2024-09-07 | Stop reason: HOSPADM

## 2024-09-06 RX ORDER — MORPHINE SULFATE 4 MG/ML
4 INJECTION, SOLUTION INTRAMUSCULAR; INTRAVENOUS
Status: DISCONTINUED | OUTPATIENT
Start: 2024-09-06 | End: 2024-09-07 | Stop reason: HOSPADM

## 2024-09-06 RX ORDER — ACETAMINOPHEN 500 MG
1000 TABLET ORAL EVERY 8 HOURS PRN
Status: DISCONTINUED | OUTPATIENT
Start: 2024-09-06 | End: 2024-09-07 | Stop reason: HOSPADM

## 2024-09-06 RX ORDER — SODIUM CHLORIDE, SODIUM LACTATE, POTASSIUM CHLORIDE, AND CALCIUM CHLORIDE .6; .31; .03; .02 G/100ML; G/100ML; G/100ML; G/100ML
1000 INJECTION, SOLUTION INTRAVENOUS ONCE
Status: COMPLETED | OUTPATIENT
Start: 2024-09-06 | End: 2024-09-06

## 2024-09-06 RX ORDER — OXYCODONE HYDROCHLORIDE 5 MG/1
5 TABLET ORAL EVERY 4 HOURS PRN
Status: DISCONTINUED | OUTPATIENT
Start: 2024-09-06 | End: 2024-09-07 | Stop reason: HOSPADM

## 2024-09-06 RX ORDER — MORPHINE SULFATE 4 MG/ML
2 INJECTION, SOLUTION INTRAMUSCULAR; INTRAVENOUS
Status: DISCONTINUED | OUTPATIENT
Start: 2024-09-06 | End: 2024-09-07 | Stop reason: HOSPADM

## 2024-09-06 RX ADMIN — SODIUM CHLORIDE, POTASSIUM CHLORIDE, SODIUM LACTATE AND CALCIUM CHLORIDE: 600; 310; 30; 20 INJECTION, SOLUTION INTRAVENOUS at 23:21

## 2024-09-06 RX ADMIN — SODIUM CHLORIDE, POTASSIUM CHLORIDE, SODIUM LACTATE AND CALCIUM CHLORIDE 1000 ML: 600; 310; 30; 20 INJECTION, SOLUTION INTRAVENOUS at 22:34

## 2024-09-06 RX ADMIN — OXYCODONE HYDROCHLORIDE 5 MG: 5 TABLET ORAL at 23:52

## 2024-09-06 RX ADMIN — ONDANSETRON 4 MG: 2 INJECTION INTRAMUSCULAR; INTRAVENOUS at 23:25

## 2024-09-06 ASSESSMENT — PAIN SCALES - GENERAL: PAINLEVEL_OUTOF10: 5

## 2024-09-06 ASSESSMENT — PAIN DESCRIPTION - LOCATION: LOCATION: ABDOMEN

## 2024-09-06 ASSESSMENT — PAIN DESCRIPTION - ORIENTATION: ORIENTATION: RIGHT

## 2024-09-06 ASSESSMENT — PAIN DESCRIPTION - DESCRIPTORS: DESCRIPTORS: TIGHTNESS

## 2024-09-06 NOTE — PROGRESS NOTES
4784- Pt arrived to L&D in wheelchair w/ FOB. Pt c/o abdominal pain that began yesterday after a vomiting episode. Pt states she has been experiencing n&v off and on throughout the pregnancy. Pt states the pain is constant, but gets worse w/ movement. Pt states pain is so bad that she was unable to sleep well last night. Pt feeling baby move, but not as much as usual. Pt denies any leaking of fluid or vaginal bleeding. Pt reports drinking approx 3 bottles of water yesterday. Pt escorted to bathroom and instructed to change into pt gown and leave urine sample.    1021- Pt into bed and hooked up to EFM.    1120- Dr. Allen at bedside to see pt. MD informed pt that it sounds like round ligament pain and instructed pt to try warm compresses and tylenol at home. EFM discontinued.    1136- Discharge instructions reviewed w/ pt and FOB, no questions at this time. Pt ambulatory off unit.

## 2024-09-06 NOTE — PROGRESS NOTES
9/6/2024        RE: Bindu Tarango         [address] [facility] [address]          To Whom It May Concern,      Due to medical reasons, Bindu Sukhdeep   may return to work on 9/7/2024        Sincerely,          Penny Steward RN

## 2024-09-06 NOTE — ED TRIAGE NOTES
27 weeks pregnant presents with  lower abdominal pain that started yesterday , ob [place]. N/V yesterday. No cp or sob. Spoke with papi in l&d pt to room 6.  
Rate:   Fetal heart rate is the 120-140 beat per minute range appropriate for gestational age.    Non-Stress Test:   An NST was performed on this patient which I personally reviewed and interpreted.  Indication(s) for testing are as listed above.     Duration of Testin minutes    FHTs:  Baseline BPM:  Variability: Moderate  Accelerations: Present 15x15    Decelerations: Absent  Category: 1  Reactivity:  REACTIVE    CONTRACTIONS:  Monitored by:  TOCO  Frequency:  q  Minutes None  Duration:   n/a Seconds  Intensitiy:  n/a  Mild/ Moderate/Severe  Resting Tone:  Relaxed      Uterine activity/Contractions:   Monitored with [place].  q  minutes    General Appearance:     No acute distress.  Oriented to person, place, and time.       Able to easily express reason for presentation, symptoms       and concerns.    HEENT:    CV:  Lungs:     Normocephalic, atraumatic, pupils equal and round,      EOMI.     Regular rate, regular rhythym.     Non-labored breathing, symetrical chest rise, no            accessory muscle use.    Abdomen:     Soft, non-tender, NABS, no HSM, no masses.  Fundus      appropriate size for gestational age and nontender.  Mild fundal tenderness c/w round ligament pain symptoms      EFW: Less than 4500g           Pelvic:   Perineum: dry  Pelvic not indicated        Extremities:    No C/C/E, intact distal pulses, no cords.  No significant        lower extremity edema   Skin:    Skin color, texture, turgor normal, no rashes, lesions,        abrasions or lacerations.    Neurologic:    Psych:    Appears generally intact. Moves all four extremities, no       dysarthria, cranial nerves II-XII grossly intact,      Normal mood. Appropriate. Alert and oriented x 3.       Labs:   Recent Results (from the past 24 hour(s))   Urinalysis with Reflex to Culture    Collection Time: 24 10:15 AM    Specimen: Urine   Result Value Ref Range    Color, UA Yellow/Straw      Appearance Clear Clear      Specific Gravity,

## 2024-09-07 ENCOUNTER — HOSPITAL ENCOUNTER (OUTPATIENT)
Facility: HOSPITAL | Age: 29
Setting detail: OBSERVATION
Discharge: HOME | End: 2024-09-10
Payer: MEDICAID

## 2024-09-07 VITALS
SYSTOLIC BLOOD PRESSURE: 103 MMHG | HEART RATE: 94 BPM | RESPIRATION RATE: 16 BRPM | DIASTOLIC BLOOD PRESSURE: 56 MMHG | OXYGEN SATURATION: 98 % | TEMPERATURE: 98.6 F

## 2024-09-07 PROCEDURE — G0378 HOSPITAL OBSERVATION PER HR: HCPCS

## 2024-09-07 PROCEDURE — 2580000003 HC RX 258: Performed by: OBSTETRICS & GYNECOLOGY

## 2024-09-07 PROCEDURE — 76705 ECHO EXAM OF ABDOMEN: CPT

## 2024-09-07 PROCEDURE — 94761 N-INVAS EAR/PLS OXIMETRY MLT: CPT

## 2024-09-07 PROCEDURE — 96361 HYDRATE IV INFUSION ADD-ON: CPT

## 2024-09-07 RX ADMIN — SODIUM CHLORIDE, POTASSIUM CHLORIDE, SODIUM LACTATE AND CALCIUM CHLORIDE 125 ML/HR: 600; 310; 30; 20 INJECTION, SOLUTION INTRAVENOUS at 05:47

## 2024-09-07 NOTE — PROGRESS NOTES
9/7/2024        RE: Bindu Tarango         [address] [facility] [address]          To Whom It May Concern,      Due to medical reasons, Bindu Tarango may  may return to work on 9/10/24.        Sincerely,          NABIL ALLEN RN /   on behalf of Dr. JEFFERY Jones MD

## 2024-09-07 NOTE — PROGRESS NOTES
Progress Note    Bindu Tarango  [id number]  1995   27w4d      S:  Feeling much improved. RUQ pain has resolved completely. She denies nausea, vomiting, abdominal pain. +GFM.    O:    BP (!) 95/56   Pulse (!) 102   Temp 98.6 °F (37 °C) (Oral)   Resp 16   LMP 2024 (Approximate)   SpO2 98%        A/P:  28 y.o.  @ 27w4d - RUQ pain  -RUQ sono unremarkable  -Labs are unremarkable, no leukocytosis, LFTs and lipase normal  -Bps wnl  -Fetal status reassuring  -Pain has now resolved. Patient tolerating regular diet. Ok for discharge home. Precautions reviewed  -NST prior to discharge    Millicent Jones MD  [facility]

## 2024-09-07 NOTE — PROGRESS NOTES
2122: Patient presented to L&D with c/o abdominal pain in RUQ and intermittent nausea. States she has vomited approx. 6 times today. Was a ED earlier today for same complaint. States they told her is was round ligament pain. Patient states she had elevated Bps while at hospital. Worried about preE.   2254: Dr. Tarango at bedside discussing POC.   0011: Dr. Tarango reviewing lab results and FHR tracing. Okay for patient to come off monitor. Plan for patient to be monitored for 30 min qshift.   0340: Bedside shift change report given to CALIN Madrid (oncoming nurse) by CALIN Valdes (offgoing nurse). Report included the following information Nurse Handoff Report, Index, Intake/Output, MAR, and Recent Results.

## 2024-09-07 NOTE — DISCHARGE INSTRUCTIONS
There is no sure way to prevent labor before your due date ( labor) or to prevent most other pregnancy problems. But there are things you can do to increase your chances of a healthy pregnancy. Go to your appointments, follow your doctor's advice, and take good care of yourself. Eat healthy foods, and exercise (if your doctor agrees). And make sure to drink plenty of water.  Follow-up care is a key part of your treatment and safety. Be sure to make and go to all appointments, and call your doctor if you are having problems. It's also a good idea to know your test results and keep a list of the medicines you take.  How can you care for yourself at home?  Make sure you go to your prenatal appointments. At each visit, your doctor will check your blood pressure and weight. Your doctor will also listen for a fetal heartbeat and measure the size of the uterus.  Drink plenty of fluids. Dehydration can cause contractions. If you have kidney, heart, or liver disease and have to limit fluids, talk with your doctor before you increase the amount of fluids you drink.  Tell your doctor right away if you notice any symptoms of an infection, such as:  Burning when you urinate.  A frequent need to urinate without being able to pass much urine.  A foul-smelling discharge from your vagina.  Vaginal itching.  Unexplained fever.  Unusual pain or soreness in your uterus or lower belly.  Avoid foods that may be harmful.  Don't eat raw meat, deli meat, raw seafood, or raw eggs.  Avoid soft cheese and unpasteurized dairy, like Brie and blue cheese.  Avoid fish that are high in mercury. These include shark, swordfish, kodak mackerel, marlin, orange roughy, and bigeye tuna, as well as tilefish from the [place].  If you smoke or vape, quit or cut back as much as you can. Talk to your doctor if you need help quitting.  If you use alcohol, marijuana, or other drugs, quit or cut back as much as you can. It's safest not to use them  seeing spots).  A severe headache.     You have symptoms of a blood clot in your leg (called a deep vein thrombosis), such as:  Pain in the calf, back of the knee, thigh, or groin.  Swelling in the leg or groin.  A color change on the leg or groin. The skin may be reddish or purplish, depending on your usual skin color.     You have any vaginal bleeding.     You have belly pain or cramping.     You have a fever.     You've been having regular contractions for an hour. This means that you've had at least 6 contractions within 1 hour, even after you change your position and drink fluids.     You have symptoms of a urinary tract infection. These may include:  Pain or burning when you urinate.  A frequent need to urinate without being able to pass much urine.  Pain in your low back (below the rib cage and above the waist).  Blood in your urine.     You have a sudden release of fluid from your vagina.     You have low back pain or pelvic pressure that does not go away.     You notice that your baby has stopped moving or is moving less than normal.   Watch closely for changes in your health, and be sure to contact your doctor if:    You have vaginal discharge that smells bad.     You feel sad, anxious, or hopeless for more than a few days.     You have other concerns about your pregnancy.   Where can you learn more?  Go to https://www.[employer].net/patientEd and enter Y951 to learn more about \"Pregnancy Precautions: Care Instructions.\"  Current as of: July 10, 2023  Content Version: 14.1  © 5766-3361 [employer].   Care instructions adapted under license by [employer]. If you have questions about a medical condition or this instruction, always ask your healthcare professional. [employer] disclaims any warranty or liability for your use of this information.

## 2024-09-07 NOTE — PROGRESS NOTES
0345: SBAR report received from FREDDIE Helton RN. Care of patient assumed at this time.    0409: Patient denies pain and reports feeling, \"much better\" at this time.    0700: Bedside and Verbal shift change report given to Jelani Daniels RN (oncoming nurse) by CALIN Mahmood (offgoing nurse). Report included the following information Nurse Handoff Report, MAR, Recent Results, and Quality Measures.

## 2024-09-07 NOTE — PROGRESS NOTES
0700: SBAR report received from YAQUELIN Mahmood RN    0806: Pt doing well at this time. Pt denies any pain or nausea/vomiting. Pt states the pain that brought her in last night has gone away. Pt reports being hungry. Pt advised we are awaiting the Ultrasound to be performed prior to her being able to eat. Pt verbalized understanding.     0901: Ultrasound at bedside. EFM removed for purpose of abdominal Ultrasound to be performed.     1230: Dr. Jones informed pt is reporting feeling very hungry and nauseous from not eating. Pt is upset she still does not have any imaging results and is unable to eat. Pt would like to eat now. Pt states she has not eaten for 2 days due to not feeling well previously. VORB from Dr. Jones that patient may eat. Pt encouraged to eat light and introduce food slowly. Pt verbalized understanding.     1345: Pt has eaten a regular diet for lunch. Denies any abdominal pain, nausea, or vomiting.     1415: VORB to discharge patient home after 20 min. NST.     1425: Pt requesting a note to be out of work for the weekend.     1500: Written discharge instructions reviewed with patient. Pt states she has a regular scheduled OB appointment for 9/13 that she will keep. Pt verbalized understanding of all teaching and denies any questions or concerns.     1505: Pt left the unit ambulatory in no signs of distress.

## 2024-09-07 NOTE — H&P
History & Physical    Name: Bindu Tarango MRN: [id number]  SSN: [id number]    YOB: 1995  Age: 28 y.o.  Sex: female        Subjective:   Chief Complaint: Abdominal pain  Estimated Date of Delivery: 12/3/24  OB History    Para Term  AB Living   3 1 1   1 1   SAB IAB Ectopic Molar Multiple Live Births   1         1      # Outcome Date GA Lbr Agustin/2nd Weight Sex Type Anes PTL Lv   3 Current            2 Term 2017    F CS-Unspec EPI  RASHMI   1 SAB                Bindu Tarango, 28 y.o.,  ,  presents at 27w3d, complaining of Abdominal pain.  She complains of RUQ pain that started yesterday, associated with nausea and vomiting.  She is unable to keep anything down.  She is not hungry.  Her pain radiates to her back and shoulder blade area.   Prenatal course was normal. Please see prenatal records for details.    Allergies   Allergen Reactions    Amoxicillin Anaphylaxis    Penicillins Anaphylaxis       Prior to Admission medications    Medication Sig Start Date End Date Taking? Authorizing Provider   Prenatal Vit-Fe Fumarate-FA (PRENATAL VITAMINS PO) Take by mouth   Yes Provider, Evelin, MD   albuterol (PROVENTIL) (5 MG/ML) 0.5% nebulizer solution Inhale into the lungs once   Yes Automatic Reconciliation, Ar   ondansetron (ZOFRAN-ODT) 4 MG disintegrating tablet Take 1 tablet by mouth every 8 hours as needed 10/24/22  Yes Automatic Reconciliation, Ar   norethindrone-ethinyl estradiol (MICKI FE ) 1-20 MG-MCG per tablet Take 1 tablet by mouth daily  Patient not taking: Reported on 2024   Automatic Reconciliation, Ar       Past Medical History:   Diagnosis Date    Bronchitis     Toe fracture, left        Past Surgical History:   Procedure Laterality Date     SECTION  2017    TOE SURGERY Left     fracture repair    WRIST FRACTURE SURGERY Left        Social History     Occupational History    Not on file   Tobacco Use    Smoking status: Former     Types:  Cigarettes    Smokeless tobacco: Current   Vaping Use    Vaping status: Former    Substances: Nicotine, THC   Substance and Sexual Activity    Alcohol use: Not Currently    Drug use: Not Currently     Types: Marijuana (Weed)    Sexual activity: Yes     Partners: Male       No family history on file.    Review of Systems   All other systems reviewed and are negative.          Objective:     Physical Exam:    Patient Vitals for the past 24 hrs:   BP Temp Temp src Pulse Resp SpO2   24 2237 123/63 -- -- (!) 107 -- 96 %   24 2130 123/68 98.2 °F (36.8 °C) Oral (!) 110 18 97 %         General:   28 y.o.  female who appears to be in no acute distress.    HEENT:   Normocephalic.  Pupils are equal, round, and reactive to light.  Extraocular movements are intact.  Pharynx is clear.  Neck:   Normal range of motion.  No thyromegaly.   Heart:   Regular rate and rhythm.  No murmurs present.  Lungs:   Clear, no rales, rhonchi, or wheezes.  Abdomen:   Soft, gravid, Tender in RUQ , normal bowel sounds.  No CVA tenderness     Uterine Activity:    Frequency: None  Fetal Heart Rate:     Baseline: 150 bpm    Variability: Moderate    Accelerations: Present (15 x 15 bpm)    Decelerations: None  Category: 1  Pelvic:    Membranes:Intact    Extremites:   Trace bilateral pedal edema.  Full range of motion.  Neuro:    Alert. Oriented.  CN 2 - 12 intact.  Motor and sensory exam grossly normal.      Prenatal Labs   Lab Results   Component Value Date/Time    ABORH O Negative 2024 02:13 PM     Recent Results (from the past 12 hour(s))   CBC with Auto Differential    Collection Time: 24 10:18 PM   Result Value Ref Range    WBC 14.8 (H) 3.6 - 11.0 K/uL    RBC 4.31 3.80 - 5.20 M/uL    Hemoglobin 12.4 11.5 - 16.0 g/dL    Hematocrit 36.1 35.0 - 47.0 %    MCV 83.8 80.0 - 99.0 FL    MCH 28.8 26.0 - 34.0 PG    MCHC 34.3 30.0 - 36.5 g/dL    RDW 13.2 11.5 - 14.5 %    Platelets 256 150 - 400 K/uL    MPV 9.9 8.9 - 12.9 FL

## 2024-11-08 LAB — GBS, EXTERNAL RESULT: NEGATIVE

## 2024-11-22 ENCOUNTER — ANESTHESIA EVENT (OUTPATIENT)
Facility: HOSPITAL | Age: 29
DRG: 540 | End: 2024-11-22
Payer: MEDICAID

## 2024-11-22 ENCOUNTER — HOSPITAL ENCOUNTER (INPATIENT)
Facility: HOSPITAL | Age: 29
LOS: 2 days | Discharge: HOME | DRG: 540 | End: 2024-11-24
Attending: STUDENT IN AN ORGANIZED HEALTH CARE EDUCATION/TRAINING PROGRAM | Admitting: OBSTETRICS & GYNECOLOGY
Payer: MEDICAID

## 2024-11-22 ENCOUNTER — ANESTHESIA (OUTPATIENT)
Facility: HOSPITAL | Age: 29
DRG: 540 | End: 2024-11-22
Payer: MEDICAID

## 2024-11-22 PROBLEM — Z34.90 TERM PREGNANCY, REPEAT: Status: ACTIVE | Noted: 2024-11-22

## 2024-11-22 LAB
ABO + RH BLD: NORMAL
ALBUMIN SERPL-MCNC: 3 G/DL (ref 3.5–5)
ALBUMIN/GLOB SERPL: 0.9 (ref 1.1–2.2)
ALP SERPL-CCNC: 164 U/L (ref 45–117)
ALT SERPL-CCNC: 13 U/L (ref 12–78)
ANION GAP SERPL CALC-SCNC: 9 MMOL/L (ref 2–12)
AST SERPL-CCNC: 15 U/L (ref 15–37)
BILIRUB SERPL-MCNC: 0.4 MG/DL (ref 0.2–1)
BLOOD GROUP ANTIBODIES SERPL: NORMAL
BUN SERPL-MCNC: 6 MG/DL (ref 6–20)
BUN/CREAT SERPL: 11 (ref 12–20)
CALCIUM SERPL-MCNC: 8.8 MG/DL (ref 8.5–10.1)
CHLORIDE SERPL-SCNC: 108 MMOL/L (ref 97–108)
CO2 SERPL-SCNC: 21 MMOL/L (ref 21–32)
CREAT SERPL-MCNC: 0.56 MG/DL (ref 0.55–1.02)
ERYTHROCYTE [DISTWIDTH] IN BLOOD BY AUTOMATED COUNT: 14 % (ref 11.5–14.5)
GLOBULIN SER CALC-MCNC: 3.4 G/DL (ref 2–4)
GLUCOSE SERPL-MCNC: 87 MG/DL (ref 65–100)
HCT VFR BLD AUTO: 39.8 % (ref 35–47)
HGB BLD-MCNC: 13.4 G/DL (ref 11.5–16)
MCH RBC QN AUTO: 26.9 PG (ref 26–34)
MCHC RBC AUTO-ENTMCNC: 33.7 G/DL (ref 30–36.5)
MCV RBC AUTO: 79.9 FL (ref 80–99)
NRBC # BLD: 0 K/UL (ref 0–0.01)
NRBC BLD-RTO: 0 PER 100 WBC
PLATELET # BLD AUTO: 271 K/UL (ref 150–400)
PMV BLD AUTO: 10.1 FL (ref 8.9–12.9)
POTASSIUM SERPL-SCNC: 4.1 MMOL/L (ref 3.5–5.1)
PROT SERPL-MCNC: 6.4 G/DL (ref 6.4–8.2)
RBC # BLD AUTO: 4.98 M/UL (ref 3.8–5.2)
SODIUM SERPL-SCNC: 138 MMOL/L (ref 136–145)
SPECIMEN EXP DATE BLD: NORMAL
WBC # BLD AUTO: 11.9 K/UL (ref 3.6–11)

## 2024-11-22 PROCEDURE — 86901 BLOOD TYPING SEROLOGIC RH(D): CPT

## 2024-11-22 PROCEDURE — 2580000003 HC RX 258: Performed by: OBSTETRICS & GYNECOLOGY

## 2024-11-22 PROCEDURE — 6360000002 HC RX W HCPCS: Performed by: ANESTHESIOLOGY

## 2024-11-22 PROCEDURE — 36415 COLL VENOUS BLD VENIPUNCTURE: CPT

## 2024-11-22 PROCEDURE — 3700000001 HC ADD 15 MINUTES (ANESTHESIA): Performed by: OBSTETRICS & GYNECOLOGY

## 2024-11-22 PROCEDURE — 3609079900 HC CESAREAN SECTION: Performed by: OBSTETRICS & GYNECOLOGY

## 2024-11-22 PROCEDURE — 6360000002 HC RX W HCPCS: Performed by: OBSTETRICS & GYNECOLOGY

## 2024-11-22 PROCEDURE — 6360000002 HC RX W HCPCS: Performed by: NURSE ANESTHETIST, CERTIFIED REGISTERED

## 2024-11-22 PROCEDURE — 1120000000 HC RM PRIVATE OB

## 2024-11-22 PROCEDURE — 3700000000 HC ANESTHESIA ATTENDED CARE: Performed by: OBSTETRICS & GYNECOLOGY

## 2024-11-22 PROCEDURE — 85027 COMPLETE CBC AUTOMATED: CPT

## 2024-11-22 PROCEDURE — 2500000003 HC RX 250 WO HCPCS: Performed by: NURSE ANESTHETIST, CERTIFIED REGISTERED

## 2024-11-22 PROCEDURE — 2709999900 HC NON-CHARGEABLE SUPPLY: Performed by: OBSTETRICS & GYNECOLOGY

## 2024-11-22 PROCEDURE — 6360000002 HC RX W HCPCS

## 2024-11-22 PROCEDURE — 86850 RBC ANTIBODY SCREEN: CPT

## 2024-11-22 PROCEDURE — 86900 BLOOD TYPING SEROLOGIC ABO: CPT

## 2024-11-22 PROCEDURE — 86592 SYPHILIS TEST NON-TREP QUAL: CPT

## 2024-11-22 PROCEDURE — 80053 COMPREHEN METABOLIC PANEL: CPT

## 2024-11-22 PROCEDURE — 7100000001 HC PACU RECOVERY - ADDTL 15 MIN: Performed by: OBSTETRICS & GYNECOLOGY

## 2024-11-22 PROCEDURE — 7100000000 HC PACU RECOVERY - FIRST 15 MIN: Performed by: OBSTETRICS & GYNECOLOGY

## 2024-11-22 RX ORDER — SIMETHICONE 80 MG
80 TABLET,CHEWABLE ORAL EVERY 6 HOURS PRN
Status: DISCONTINUED | OUTPATIENT
Start: 2024-11-22 | End: 2024-11-24 | Stop reason: HOSPADM

## 2024-11-22 RX ORDER — SODIUM CHLORIDE 9 MG/ML
INJECTION, SOLUTION INTRAVENOUS PRN
Status: DISCONTINUED | OUTPATIENT
Start: 2024-11-22 | End: 2024-11-24 | Stop reason: ALTCHOICE

## 2024-11-22 RX ORDER — FERROUS SULFATE 325(65) MG
325 TABLET ORAL 2 TIMES DAILY WITH MEALS
Status: DISCONTINUED | OUTPATIENT
Start: 2024-11-22 | End: 2024-11-24 | Stop reason: HOSPADM

## 2024-11-22 RX ORDER — OXYTOCIN 10 [USP'U]/ML
INJECTION, SOLUTION INTRAMUSCULAR; INTRAVENOUS
Status: DISCONTINUED | OUTPATIENT
Start: 2024-11-22 | End: 2024-11-22 | Stop reason: SDUPTHER

## 2024-11-22 RX ORDER — CLINDAMYCIN PHOSPHATE 900 MG/50ML
900 INJECTION, SOLUTION INTRAVENOUS ONCE
Status: COMPLETED | OUTPATIENT
Start: 2024-11-22 | End: 2024-11-22

## 2024-11-22 RX ORDER — DOCUSATE SODIUM 100 MG/1
100 CAPSULE, LIQUID FILLED ORAL 2 TIMES DAILY
Status: DISCONTINUED | OUTPATIENT
Start: 2024-11-22 | End: 2024-11-24 | Stop reason: HOSPADM

## 2024-11-22 RX ORDER — METHYLERGONOVINE MALEATE 0.2 MG/ML
INJECTION INTRAVENOUS
Status: COMPLETED
Start: 2024-11-22 | End: 2024-11-22

## 2024-11-22 RX ORDER — MORPHINE SULFATE 0.5 MG/ML
INJECTION, SOLUTION EPIDURAL; INTRATHECAL; INTRAVENOUS
Status: DISCONTINUED | OUTPATIENT
Start: 2024-11-22 | End: 2024-11-22

## 2024-11-22 RX ORDER — ONDANSETRON 4 MG/1
4 TABLET, ORALLY DISINTEGRATING ORAL EVERY 8 HOURS PRN
Status: DISCONTINUED | OUTPATIENT
Start: 2024-11-22 | End: 2024-11-24 | Stop reason: HOSPADM

## 2024-11-22 RX ORDER — ONDANSETRON 2 MG/ML
INJECTION INTRAMUSCULAR; INTRAVENOUS
Status: DISCONTINUED | OUTPATIENT
Start: 2024-11-22 | End: 2024-11-22 | Stop reason: SDUPTHER

## 2024-11-22 RX ORDER — SODIUM CHLORIDE 0.9 % (FLUSH) 0.9 %
5-40 SYRINGE (ML) INJECTION EVERY 12 HOURS SCHEDULED
Status: DISCONTINUED | OUTPATIENT
Start: 2024-11-22 | End: 2024-11-24 | Stop reason: ALTCHOICE

## 2024-11-22 RX ORDER — ACETAMINOPHEN 325 MG/1
975 TABLET ORAL ONCE
Status: DISCONTINUED | OUTPATIENT
Start: 2024-11-22 | End: 2024-11-24 | Stop reason: HOSPADM

## 2024-11-22 RX ORDER — MORPHINE SULFATE 1 MG/ML
INJECTION, SOLUTION EPIDURAL; INTRATHECAL; INTRAVENOUS
Status: COMPLETED | OUTPATIENT
Start: 2024-11-22 | End: 2024-11-22

## 2024-11-22 RX ORDER — HYDROMORPHONE HYDROCHLORIDE 1 MG/ML
0.5 INJECTION, SOLUTION INTRAMUSCULAR; INTRAVENOUS; SUBCUTANEOUS
Status: DISCONTINUED | OUTPATIENT
Start: 2024-11-22 | End: 2024-11-24 | Stop reason: ALTCHOICE

## 2024-11-22 RX ORDER — BUPIVACAINE HYDROCHLORIDE 7.5 MG/ML
INJECTION, SOLUTION INTRASPINAL
Status: COMPLETED | OUTPATIENT
Start: 2024-11-22 | End: 2024-11-22

## 2024-11-22 RX ORDER — SODIUM CHLORIDE 9 MG/ML
INJECTION, SOLUTION INTRAVENOUS PRN
Status: DISCONTINUED | OUTPATIENT
Start: 2024-11-22 | End: 2024-11-22 | Stop reason: SDUPTHER

## 2024-11-22 RX ORDER — OXYCODONE HYDROCHLORIDE 5 MG/1
10 TABLET ORAL EVERY 4 HOURS PRN
Status: DISCONTINUED | OUTPATIENT
Start: 2024-11-22 | End: 2024-11-24 | Stop reason: HOSPADM

## 2024-11-22 RX ORDER — SODIUM CHLORIDE 0.9 % (FLUSH) 0.9 %
10 SYRINGE (ML) INJECTION PRN
Status: DISCONTINUED | OUTPATIENT
Start: 2024-11-22 | End: 2024-11-24 | Stop reason: ALTCHOICE

## 2024-11-22 RX ORDER — SENNA AND DOCUSATE SODIUM 50; 8.6 MG/1; MG/1
1 TABLET, FILM COATED ORAL DAILY
Status: DISCONTINUED | OUTPATIENT
Start: 2024-11-22 | End: 2024-11-24 | Stop reason: HOSPADM

## 2024-11-22 RX ORDER — FAMOTIDINE 10 MG/ML
INJECTION, SOLUTION INTRAVENOUS
Status: DISCONTINUED | OUTPATIENT
Start: 2024-11-22 | End: 2024-11-22 | Stop reason: SDUPTHER

## 2024-11-22 RX ORDER — OXYCODONE HYDROCHLORIDE 5 MG/1
5 TABLET ORAL EVERY 4 HOURS PRN
Status: DISCONTINUED | OUTPATIENT
Start: 2024-11-22 | End: 2024-11-24 | Stop reason: HOSPADM

## 2024-11-22 RX ORDER — BISACODYL 10 MG
10 SUPPOSITORY, RECTAL RECTAL DAILY PRN
Status: DISCONTINUED | OUTPATIENT
Start: 2024-11-22 | End: 2024-11-24 | Stop reason: HOSPADM

## 2024-11-22 RX ORDER — ONDANSETRON 2 MG/ML
4 INJECTION INTRAMUSCULAR; INTRAVENOUS EVERY 6 HOURS PRN
Status: DISCONTINUED | OUTPATIENT
Start: 2024-11-22 | End: 2024-11-22

## 2024-11-22 RX ORDER — ONDANSETRON 2 MG/ML
4 INJECTION INTRAMUSCULAR; INTRAVENOUS EVERY 6 HOURS PRN
Status: DISCONTINUED | OUTPATIENT
Start: 2024-11-22 | End: 2024-11-24 | Stop reason: HOSPADM

## 2024-11-22 RX ORDER — METHYLERGONOVINE MALEATE 0.2 MG/ML
200 INJECTION INTRAVENOUS PRN
Status: DISCONTINUED | OUTPATIENT
Start: 2024-11-22 | End: 2024-11-24 | Stop reason: HOSPADM

## 2024-11-22 RX ORDER — KETOROLAC TROMETHAMINE 30 MG/ML
30 INJECTION, SOLUTION INTRAMUSCULAR; INTRAVENOUS EVERY 6 HOURS SCHEDULED
Status: COMPLETED | OUTPATIENT
Start: 2024-11-22 | End: 2024-11-23

## 2024-11-22 RX ORDER — FENTANYL CITRATE 50 UG/ML
INJECTION, SOLUTION INTRAMUSCULAR; INTRAVENOUS
Status: COMPLETED | OUTPATIENT
Start: 2024-11-22 | End: 2024-11-22

## 2024-11-22 RX ORDER — SODIUM CHLORIDE 9 MG/ML
INJECTION, SOLUTION INTRAVENOUS CONTINUOUS
Status: DISCONTINUED | OUTPATIENT
Start: 2024-11-22 | End: 2024-11-24 | Stop reason: ALTCHOICE

## 2024-11-22 RX ORDER — HYDROMORPHONE HYDROCHLORIDE 1 MG/ML
0.25 INJECTION, SOLUTION INTRAMUSCULAR; INTRAVENOUS; SUBCUTANEOUS
Status: DISCONTINUED | OUTPATIENT
Start: 2024-11-22 | End: 2024-11-24 | Stop reason: ALTCHOICE

## 2024-11-22 RX ORDER — SODIUM CHLORIDE, SODIUM LACTATE, POTASSIUM CHLORIDE, CALCIUM CHLORIDE 600; 310; 30; 20 MG/100ML; MG/100ML; MG/100ML; MG/100ML
INJECTION, SOLUTION INTRAVENOUS CONTINUOUS
Status: DISCONTINUED | OUTPATIENT
Start: 2024-11-22 | End: 2024-11-24 | Stop reason: ALTCHOICE

## 2024-11-22 RX ORDER — SODIUM CHLORIDE 0.9 % (FLUSH) 0.9 %
5-40 SYRINGE (ML) INJECTION PRN
Status: DISCONTINUED | OUTPATIENT
Start: 2024-11-22 | End: 2024-11-24 | Stop reason: ALTCHOICE

## 2024-11-22 RX ORDER — IBUPROFEN 800 MG/1
800 TABLET, FILM COATED ORAL EVERY 8 HOURS
Status: DISCONTINUED | OUTPATIENT
Start: 2024-11-23 | End: 2024-11-24 | Stop reason: HOSPADM

## 2024-11-22 RX ORDER — SODIUM CHLORIDE, SODIUM LACTATE, POTASSIUM CHLORIDE, AND CALCIUM CHLORIDE .6; .31; .03; .02 G/100ML; G/100ML; G/100ML; G/100ML
1000 INJECTION, SOLUTION INTRAVENOUS ONCE
Status: COMPLETED | OUTPATIENT
Start: 2024-11-22 | End: 2024-11-22

## 2024-11-22 RX ADMIN — ONDANSETRON 4 MG: 2 INJECTION, SOLUTION INTRAMUSCULAR; INTRAVENOUS at 15:04

## 2024-11-22 RX ADMIN — OXYTOCIN 30 UNITS: 10 INJECTION, SOLUTION INTRAMUSCULAR; INTRAVENOUS at 15:39

## 2024-11-22 RX ADMIN — SODIUM CHLORIDE, SODIUM LACTATE, POTASSIUM CHLORIDE, AND CALCIUM CHLORIDE: .6; .31; .03; .02 INJECTION, SOLUTION INTRAVENOUS at 12:32

## 2024-11-22 RX ADMIN — GENTAMICIN SULFATE 409.6 MG: 40 INJECTION, SOLUTION INTRAMUSCULAR; INTRAVENOUS at 15:12

## 2024-11-22 RX ADMIN — KETOROLAC TROMETHAMINE 30 MG: 30 INJECTION, SOLUTION INTRAMUSCULAR at 18:13

## 2024-11-22 RX ADMIN — SODIUM CHLORIDE, PRESERVATIVE FREE 10 ML: 5 INJECTION INTRAVENOUS at 20:00

## 2024-11-22 RX ADMIN — METHYLERGONOVINE MALEATE 200 MCG: 0.2 INJECTION INTRAVENOUS at 16:59

## 2024-11-22 RX ADMIN — CLINDAMYCIN PHOSPHATE 900 MG: 900 INJECTION, SOLUTION INTRAVENOUS at 15:09

## 2024-11-22 RX ADMIN — FENTANYL CITRATE 10 MCG: 50 INJECTION, SOLUTION INTRAMUSCULAR; INTRAVENOUS at 15:00

## 2024-11-22 RX ADMIN — BUPIVACAINE HYDROCHLORIDE IN DEXTROSE 15 MG: 7.5 INJECTION, SOLUTION SUBARACHNOID at 15:00

## 2024-11-22 RX ADMIN — FAMOTIDINE 20 MG: 10 INJECTION INTRAVENOUS at 15:04

## 2024-11-22 RX ADMIN — SODIUM CHLORIDE: 9 INJECTION, SOLUTION INTRAVENOUS at 17:54

## 2024-11-22 RX ADMIN — SODIUM CHLORIDE, SODIUM LACTATE, POTASSIUM CHLORIDE, AND CALCIUM CHLORIDE 1000 ML: .6; .31; .03; .02 INJECTION, SOLUTION INTRAVENOUS at 11:45

## 2024-11-22 RX ADMIN — MORPHINE SULFATE 0.2 MG: 1 INJECTION, SOLUTION EPIDURAL; INTRATHECAL; INTRAVENOUS at 15:00

## 2024-11-22 RX ADMIN — METHYLERGONOVINE MALEATE 200 MCG: 0.2 INJECTION, SOLUTION INTRAMUSCULAR; INTRAVENOUS at 16:59

## 2024-11-22 ASSESSMENT — PAIN SCALES - GENERAL: PAINLEVEL_OUTOF10: 5

## 2024-11-22 ASSESSMENT — PAIN DESCRIPTION - LOCATION: LOCATION: ABDOMEN

## 2024-11-22 ASSESSMENT — PAIN DESCRIPTION - DESCRIPTORS: DESCRIPTORS: DISCOMFORT

## 2024-11-22 NOTE — BRIEF OP NOTE
Brief Postoperative Note      Patient: Bindu Tarango  YOB: 1995  MRN: [id number]    Date of Procedure: 2024    Pre-Op Diagnosis Codes:      * Previous  delivery affecting pregnancy [O34.219]    Post-Op Diagnosis: Same       Procedure(s):   SECTION    Surgeon(s):  Cat Sidhu DO    Assistant:  Surgical Assistant: Porter Penny    Anesthesia: Spinal    Estimated Blood Loss (mL): 400    UOP:  50cc    Complications: None    Specimens:   * No specimens in log *    Implants:  * No implants in log *      Drains:   Urinary Catheter 24 Escobar (Active)   $ Urethral catheter insertion Inserted for procedure 24 151   Catheter Indications Perioperative use for selected surgical procedures 24 151   Site Assessment No urethral drainage 24 151   Urine Color Yellow 24 151   Urine Appearance Clear 24 151   Collection Container Standard 24 151   Securement Method Securing device (Describe) 24 151   Catheter Care  Perineal wipes 24 151   Catheter Best Practices  Drainage tube clipped to bed;Catheter secured to thigh;Tamper seal intact;Bag not on floor;Bag below bladder;Lack of dependent loop in tubing;Drainage bag less than half full 24 151   Status Patent 24 151       Findings:  Infection Present At Time Of Surgery (PATOS) (choose all levels that have infection present):  No infection present  Other Findings: VFI weighing 5lb 15oz apgar 8/9. Normal uterus/tubes/ovaries bilaterally.  Dense omental adhesions to anterior peritoneum, no bowel present.  Vertex, clear fluid.  2-3 cm skin breakdown at midline where previous c/s incision was made, irritated but not overtly infected.    Electronically signed by Cat Sidhu DO on 2024 at 4:24 PM

## 2024-11-22 NOTE — H&P
History & Physical    Name: Bindu Tarango MRN: [id number]  SSN: [id number]    YOB: 1995  Age: 29 y.o.  Sex: female      Subjective:     Estimated Date of Delivery: 12/3/24  OB History          3    Para   1    Term   1            AB   1    Living   1         SAB   1    IAB        Ectopic        Molar        Multiple        Live Births   1                Ms. Tarango admitted with pregnancy at 38w3d for  section due to previous  section and ROM at 10:30 this morning.  Feeling some contractions now but not uncomfortable.   Prenatal course was normal.   Please see prenatal records for details.    Past Medical History:   Diagnosis Date    Bronchitis     Toe fracture, left      Past Surgical History:   Procedure Laterality Date     SECTION  2017    TOE SURGERY Left     fracture repair    WRIST FRACTURE SURGERY Left      Social History     Occupational History    Not on file   Tobacco Use    Smoking status: Former     Types: Cigarettes    Smokeless tobacco: Current   Vaping Use    Vaping status: Former    Substances: Nicotine, THC   Substance and Sexual Activity    Alcohol use: Not Currently    Drug use: Not Currently     Types: Marijuana (Weed)    Sexual activity: Yes     Partners: Male     No family history on file.    Allergies   Allergen Reactions    Amoxicillin Anaphylaxis    Penicillins Anaphylaxis     Prior to Admission medications    Medication Sig Start Date End Date Taking? Authorizing Provider   albuterol (PROVENTIL) (5 MG/ML) 0.5% nebulizer solution Inhale into the lungs once   Yes Automatic Reconciliation, Ar   ondansetron (ZOFRAN-ODT) 4 MG disintegrating tablet Take 1 tablet by mouth every 8 hours as needed 10/24/22  Yes Automatic Reconciliation, Ar   Prenatal Vit-Fe Fumarate-FA (PRENATAL VITAMINS PO) Take by mouth  Patient not taking: Reported on 2024    Provider, MD Evelin        Review of Systems: Pertinent items are noted in the

## 2024-11-22 NOTE — DISCHARGE SUMMARY
Obstetrical Discharge Summary     Name: Bindu Tarango MRN: [id number]  SSN: [id number]    YOB: 1995  Age: 29 y.o.  Sex: female      Allergies: Amoxicillin and Penicillins    Admit Date: 2024    Discharge Date: 2024     Admitting Physician: Cat Sidhu DO     Attending Physician:  Saniya Newsome DO     * Admission Diagnoses: Previous  delivery affecting pregnancy [O34.219]  Term pregnancy, repeat [Z34.90]    * Discharge Diagnoses:   Information for the patient's :  Yu Tarango [[id number]]   @[id number]@     Additional Diagnoses:    Lab Results   Component Value Date/Time    ABORH O NEGATIVE 2024 11:44 AM      There is no immunization history on file for this patient.    * Procedures: RLTCS  Procedure(s):   SECTION           * Discharge Condition: good    * Hospital Course: Normal hospital course following the delivery.    * Disposition: Home    Discharge Medications:      Medication List        ASK your doctor about these medications      albuterol (5 MG/ML) 0.5% nebulizer solution  Commonly known as: PROVENTIL     ondansetron 4 MG disintegrating tablet  Commonly known as: ZOFRAN-ODT     PRENATAL VITAMINS PO              * Follow-up Care/Patient Instructions:  Activity: Activity as tolerated  Diet: Regular Diet  Wound Care: As directed    Follow up in the office in 1 week for removal of ANGIE dressing.

## 2024-11-22 NOTE — ANESTHESIA PROCEDURE NOTES
Spinal Block    Patient location during procedure: OB  End time: 11/22/2024 3:08 PM  Reason for block: primary anesthetic  Staffing  Performed: anesthesiologist   Anesthesiologist: José Manuel Butler MD  Resident/CRNA: Gato Polanco APRN - CRNA  Performed by: Gato Polanco APRN - CRNA  Authorized by: Kemal Hunt MD    Spinal Block  Patient position: sitting  Prep: ChloraPrep  Patient monitoring: cardiac monitor, continuous pulse ox and frequent blood pressure checks  Approach: midline  Location: L3/L4  Provider prep: mask  Needle  Needle type: Quincke   Needle length: 5 in  Assessment  Sensory level: T6  Swirl obtained: Yes  CSF: clear  Attempts: 2  Hemodynamics: stable  Preanesthetic Checklist  Completed: patient identified, IV checked, site marked, risks and benefits discussed, surgical/procedural consents, equipment checked, pre-op evaluation, timeout performed, anesthesia consent given, oxygen available, monitors applied/VS acknowledged, fire risk safety assessment completed and verbalized and blood product R/B/A discussed and consented

## 2024-11-22 NOTE — PROGRESS NOTES
1140: patient arrived ambulatory from OB office for repeat c/s due to SROM around 0930. She reports a gush of clear fluid. Cervix in office /3. Patient has +FM. She denies HA, RUQ pain, SOB and vb. Per patient, ctx approximately 5 min apart. Upon admission skin assessment, patient has incision breakdown from 2017 c/s scar.Pt states the incision scar opened up d/t skin stretching w/ current pregnancy. Placed patient on monitor and oriented to room.     1200: prenatals reviewed. Patient received RhoGAM on 24    1320: Anesthesia at bedside discussing POC.    1440: Dr Clark at bedside discussing repeat  section and plan of care.     1850: TRANSFER - OUT REPORT: Verbal report given to Ronni LAYTON on Bindu Tarango  being transferred to MIU room 456 for routine progression of patient care Report consisted of patient's Situation, Background, Assessment and Recommendations (SBAR). Information from the following report(s) Nurse Handoff Report, Adult Overview, Intake/Output, MAR, Recent Results, and Med Rec Status was reviewed with the receiving nurse.Opportunity for questions and clarification was provided. Patient transported with: Registered Nurse. Fundal performed by receiving RN upon handoff.

## 2024-11-22 NOTE — OP NOTE
[facility]          [address] [place]  [address]                            OPERATIVE REPORT      PATIENT NAME: CALIXTO CABEZAS               : 1995  MED REC NO: [id number]                       ROOM: 207  ACCOUNT NO: [id number]                       ADMIT DATE: 2024  PROVIDER: Cat Sidhu DO    DATE OF SERVICE:  2024    PREOPERATIVE DIAGNOSES:       1. Intrauterine pregnancy at 38 weeks 3 days.     2. Spontaneous rupture of membranes.     3. Previous  x1.  Declines TOLAC.    POSTOPERATIVE DIAGNOSES:       1. Intrauterine pregnancy at 38 weeks 3 days.     2. Spontaneous rupture of membranes.     3. Previous  x1. Declines TOLAC.    PROCEDURES PERFORMED:  Repeat low transverse  section.    SURGEON:  Cat Sidhu DO    ASSISTANT:  ***    ANESTHESIA:  Spinal.    ESTIMATED BLOOD LOSS:  400 mL.    SPECIMENS REMOVED:  Placenta disposed.    INTRAOPERATIVE FINDINGS:  Viable female infant weighing 5 pounds 15 ounces, Apgars 8 at 1, 9 at 5.  Normal uterus, tubes, and ovaries bilaterally.  Dense omental adhesions to the anterior peritoneum.  No bowel present.  Vertex presentation of infant.  Clear fluid.  2-3 cm skin breakdown at the midline where previous  incision was made, which appeared irritated, but not overtly infected; this was present prior to the procedure.     COMPLICATIONS:  None.    IMPLANTS:  None.    INDICATIONS:  ***    DESCRIPTION OF PROCEDURE:  The patient was taken to the operating room where spinal anesthesia was found to be adequate.  She was placed in the dorsal supine position with leftward tilt and prepped and draped in a normal sterile fashion.  A Pfannenstiel skin incision was made with a scalpel and carried down to the underlying layer of fascia.  The fascia was then nicked in the midline and the incision extended laterally with the Pearson scissors.  Superior aspect of the fascial

## 2024-11-23 LAB
ERYTHROCYTE [DISTWIDTH] IN BLOOD BY AUTOMATED COUNT: 14 % (ref 11.5–14.5)
HCT VFR BLD AUTO: 34.9 % (ref 35–47)
HGB BLD-MCNC: 11.5 G/DL (ref 11.5–16)
MCH RBC QN AUTO: 26.8 PG (ref 26–34)
MCHC RBC AUTO-ENTMCNC: 33 G/DL (ref 30–36.5)
MCV RBC AUTO: 81.4 FL (ref 80–99)
NRBC # BLD: 0 K/UL (ref 0–0.01)
NRBC BLD-RTO: 0 PER 100 WBC
PLATELET # BLD AUTO: 225 K/UL (ref 150–400)
PMV BLD AUTO: 9.8 FL (ref 8.9–12.9)
RBC # BLD AUTO: 4.29 M/UL (ref 3.8–5.2)
WBC # BLD AUTO: 10.4 K/UL (ref 3.6–11)

## 2024-11-23 PROCEDURE — 85461 HEMOGLOBIN FETAL: CPT

## 2024-11-23 PROCEDURE — 6370000000 HC RX 637 (ALT 250 FOR IP): Performed by: OBSTETRICS & GYNECOLOGY

## 2024-11-23 PROCEDURE — 86901 BLOOD TYPING SEROLOGIC RH(D): CPT

## 2024-11-23 PROCEDURE — 85027 COMPLETE CBC AUTOMATED: CPT

## 2024-11-23 PROCEDURE — 6360000002 HC RX W HCPCS: Performed by: OBSTETRICS & GYNECOLOGY

## 2024-11-23 PROCEDURE — 94761 N-INVAS EAR/PLS OXIMETRY MLT: CPT

## 2024-11-23 PROCEDURE — 86900 BLOOD TYPING SEROLOGIC ABO: CPT

## 2024-11-23 PROCEDURE — 36415 COLL VENOUS BLD VENIPUNCTURE: CPT

## 2024-11-23 PROCEDURE — 1120000000 HC RM PRIVATE OB

## 2024-11-23 PROCEDURE — 2580000003 HC RX 258: Performed by: OBSTETRICS & GYNECOLOGY

## 2024-11-23 RX ADMIN — DOCUSATE SODIUM 50MG AND SENNOSIDES 8.6MG 1 TABLET: 8.6; 5 TABLET, FILM COATED ORAL at 09:06

## 2024-11-23 RX ADMIN — KETOROLAC TROMETHAMINE 30 MG: 30 INJECTION, SOLUTION INTRAMUSCULAR at 11:25

## 2024-11-23 RX ADMIN — KETOROLAC TROMETHAMINE 30 MG: 30 INJECTION, SOLUTION INTRAMUSCULAR at 00:33

## 2024-11-23 RX ADMIN — DOCUSATE SODIUM 100 MG: 100 CAPSULE, LIQUID FILLED ORAL at 09:06

## 2024-11-23 RX ADMIN — KETOROLAC TROMETHAMINE 30 MG: 30 INJECTION, SOLUTION INTRAMUSCULAR at 06:07

## 2024-11-23 RX ADMIN — FERROUS SULFATE TAB 325 MG (65 MG ELEMENTAL FE) 325 MG: 325 (65 FE) TAB at 18:13

## 2024-11-23 RX ADMIN — OXYCODONE 5 MG: 5 TABLET ORAL at 20:14

## 2024-11-23 RX ADMIN — OXYCODONE 5 MG: 5 TABLET ORAL at 06:14

## 2024-11-23 RX ADMIN — DOCUSATE SODIUM 100 MG: 100 CAPSULE, LIQUID FILLED ORAL at 20:15

## 2024-11-23 RX ADMIN — IBUPROFEN 800 MG: 800 TABLET, FILM COATED ORAL at 18:12

## 2024-11-23 RX ADMIN — SODIUM CHLORIDE, PRESERVATIVE FREE 10 ML: 5 INJECTION INTRAVENOUS at 11:25

## 2024-11-23 ASSESSMENT — PAIN DESCRIPTION - LOCATION
LOCATION: ABDOMEN
LOCATION: ABDOMEN
LOCATION: ABDOMEN;INCISION
LOCATION: ABDOMEN;INCISION
LOCATION: ABDOMEN

## 2024-11-23 ASSESSMENT — PAIN - FUNCTIONAL ASSESSMENT
PAIN_FUNCTIONAL_ASSESSMENT: ACTIVITIES ARE NOT PREVENTED
PAIN_FUNCTIONAL_ASSESSMENT: ACTIVITIES ARE NOT PREVENTED

## 2024-11-23 ASSESSMENT — PAIN DESCRIPTION - DESCRIPTORS
DESCRIPTORS: ACHING;CRAMPING;SORE
DESCRIPTORS: ACHING;SORE
DESCRIPTORS: ACHING;CRAMPING;SORE
DESCRIPTORS: SORE
DESCRIPTORS: ACHING;DISCOMFORT;SORE;TENDER

## 2024-11-23 ASSESSMENT — PAIN DESCRIPTION - ORIENTATION
ORIENTATION: ANTERIOR;LOWER
ORIENTATION: ANTERIOR;LOWER
ORIENTATION: LOWER
ORIENTATION: LOWER;MID
ORIENTATION: ANTERIOR;LOWER

## 2024-11-23 ASSESSMENT — PAIN SCALES - GENERAL
PAINLEVEL_OUTOF10: 6
PAINLEVEL_OUTOF10: 4
PAINLEVEL_OUTOF10: 6
PAINLEVEL_OUTOF10: 7
PAINLEVEL_OUTOF10: 4

## 2024-11-23 NOTE — LACTATION NOTE
This note was copied from a baby's chart.     24 0922   Visit Information   Lactation Consult Visit Type IP Initial Consult   Visit Length 30 minutes   Reason for Visit Education;Normal [place] Visit   Breast Feeding History/Assessment   Left Breast Soft  (colostrum expressed)   Left Nipple Protrude with stimulation   Right Nipple Protrude with stimulation   Right Breast Soft  (colostrum expressed)   Breastfeeding History No   Right Side Feeding   Infant Latch Observations Rooting;Wide open mouth;AWAIS with repeated attempts;Sustained rhythmic suck   Infant Position   (prone, laid back)   Infant Response to Feeding Feeding well   LATCH Documentation   Latch 1   Audible Swallowing 1   Type of Nipple 2   Comfort (Breast/Nipple) 2   Hold (Positioning) 1   LATCH Score 7   Care Plan/Breast Care   Breast Care Using breast pump;Pumping supply provided     Infant is 17 hours of age. Assisted mother to obtain a deep latch in the prone laid back position. Mother states latch is comfortable. Mother easily expressing drops of colostrum,to offer baby for non breastfeeds. Educated mother on benefits of skin to skin, proper hand expression, adequate output for day of life and signs of good feeds. Provided mother with a [employer]folia hand pump and demonstrated use. Mother has an electric pump at home, measured for flange size.    Pt will successfully establish breastfeeding by feeding in response to early feeding cues   or wake every 3h, will obtain deep latch, and will keep log of feedings/output.  Taught to BF at hunger cues and or q 2-3 hrs and to offer 10-20 drops of hand expressed colostrum at any non-feeds.                  LATCH Documentation  Latch: Repeated attempts, hold nipple in mouth, stimulate to suck  Audible Swallowing: A few with stimulation  Type of Nipple: Everted (after stimulation)  Comfort (Breast/Nipple): Soft/non-tender  Hold (Positioning): Full assist, teach one side, mother does other, staff holds  LATCH

## 2024-11-23 NOTE — PROGRESS NOTES
PostPartum Note    Bindu Tarango  [id number]  1995  29 y.o.    S:  Ms. Bindu Tarango is a 29 y.o.  POD #1 s/p LTCS @ 38w3d.  Doing well.  She had a baby girl.  Her lochia is like a period.  She describes her pain as mild and is well controlled with PO medications.  She is ambulating and voiding.  Tolerating PO intake.      O:   /65   Pulse 86   Temp 98.2 °F (36.8 °C) (Oral)   Resp 17   Wt 115.7 kg (255 lb)   LMP 2024 (Approximate)   SpO2 97%   Breastfeeding Unknown   BMI 41.16 kg/m²     Lab Results   Component Value Date/Time    WBC 10.4 2024 05:48 AM    HGB 11.5 2024 05:48 AM    HCT 34.9 2024 05:48 AM     2024 05:48 AM    MCV 81.4 2024 05:48 AM       Gen - No acute distress  Abdomen - Appropriately tender, Fundus firm, below the umbilicus, incision clean/dry/intact.   Ext - Warm, well perfused.  Nontender    A/P:  POD #1 s/p LTCS @ 38w3d doing well.    1.  Routine PP instructions/ care discussed  2.  Circumcision n/a   3.  Discharge POD2 or 3   4.  F/U 4-6 weeks for PP check.      Aminah Lopes MD  [facility]

## 2024-11-24 VITALS
BODY MASS INDEX: 41.16 KG/M2 | HEART RATE: 81 BPM | OXYGEN SATURATION: 98 % | DIASTOLIC BLOOD PRESSURE: 57 MMHG | WEIGHT: 255 LBS | RESPIRATION RATE: 18 BRPM | SYSTOLIC BLOOD PRESSURE: 94 MMHG | TEMPERATURE: 97.5 F

## 2024-11-24 PROCEDURE — 94761 N-INVAS EAR/PLS OXIMETRY MLT: CPT

## 2024-11-24 PROCEDURE — 6360000002 HC RX W HCPCS: Performed by: STUDENT IN AN ORGANIZED HEALTH CARE EDUCATION/TRAINING PROGRAM

## 2024-11-24 PROCEDURE — 6370000000 HC RX 637 (ALT 250 FOR IP): Performed by: OBSTETRICS & GYNECOLOGY

## 2024-11-24 PROCEDURE — 96372 THER/PROPH/DIAG INJ SC/IM: CPT

## 2024-11-24 RX ORDER — OXYCODONE HYDROCHLORIDE 5 MG/1
5 TABLET ORAL EVERY 4 HOURS PRN
Qty: 10 TABLET | Refills: 0 | Status: SHIPPED | OUTPATIENT
Start: 2024-11-24 | End: 2024-11-27

## 2024-11-24 RX ADMIN — OXYCODONE 5 MG: 5 TABLET ORAL at 03:20

## 2024-11-24 RX ADMIN — IBUPROFEN 800 MG: 800 TABLET, FILM COATED ORAL at 14:40

## 2024-11-24 RX ADMIN — HUMAN RHO(D) IMMUNE GLOBULIN 300 MCG: 300 INJECTION, SOLUTION INTRAMUSCULAR at 09:33

## 2024-11-24 RX ADMIN — FERROUS SULFATE TAB 325 MG (65 MG ELEMENTAL FE) 325 MG: 325 (65 FE) TAB at 09:31

## 2024-11-24 RX ADMIN — DOCUSATE SODIUM 100 MG: 100 CAPSULE, LIQUID FILLED ORAL at 09:31

## 2024-11-24 RX ADMIN — SIMETHICONE 80 MG: 80 TABLET, CHEWABLE ORAL at 09:31

## 2024-11-24 RX ADMIN — OXYCODONE 5 MG: 5 TABLET ORAL at 14:40

## 2024-11-24 RX ADMIN — IBUPROFEN 800 MG: 800 TABLET, FILM COATED ORAL at 03:20

## 2024-11-24 ASSESSMENT — PAIN - FUNCTIONAL ASSESSMENT: PAIN_FUNCTIONAL_ASSESSMENT: ACTIVITIES ARE NOT PREVENTED

## 2024-11-24 ASSESSMENT — PAIN DESCRIPTION - DESCRIPTORS
DESCRIPTORS: ACHING;CRAMPING;DISCOMFORT;SORE;TENDER
DESCRIPTORS: ACHING;CRAMPING;SORE

## 2024-11-24 ASSESSMENT — PAIN DESCRIPTION - ORIENTATION
ORIENTATION: ANTERIOR;LOWER
ORIENTATION: LOWER;MID

## 2024-11-24 ASSESSMENT — PAIN DESCRIPTION - LOCATION
LOCATION: ABDOMEN;INCISION
LOCATION: ABDOMEN

## 2024-11-24 ASSESSMENT — PAIN SCALES - GENERAL
PAINLEVEL_OUTOF10: 8
PAINLEVEL_OUTOF10: 6

## 2024-11-24 NOTE — PROGRESS NOTES
PostPartum Note    Bindu Tarango  [id number]  1995  29 y.o.    S:  Ms. Bindu Tarango is a 29 y.o.  POD #2 s/p LTCS @ 38w3d.  Doing well.  She had a baby girl.  Her lochia is like a period.  She describes her pain as mild and is well controlled with PO medications.  She is ambulating and voiding.  Tolerating PO intake.      O:   BP (!) 94/57   Pulse 81   Temp 97.5 °F (36.4 °C) (Oral)   Resp 16   Wt 115.7 kg (255 lb)   LMP 2024 (Approximate)   SpO2 98%   Breastfeeding Unknown   BMI 41.16 kg/m²     Lab Results   Component Value Date/Time    WBC 10.4 2024 05:48 AM    HGB 11.5 2024 05:48 AM    HCT 34.9 2024 05:48 AM     2024 05:48 AM    MCV 81.4 2024 05:48 AM       Gen - No acute distress  Abdomen - Appropriately tender, Fundus firm, below the umbilicus, incision clean/dry/intact.   Ext - Warm, well perfused.  Nontender    A/P:  POD #2 s/p LTCS @ 38w3d doing well.    1.  Routine PP instructions/ care discussed  2.  Circumcision n/a   3.  Discharge today   4.  F/U 4-6 weeks for PP check.      Aminah Lopes MD  [facility]

## 2024-11-24 NOTE — PROGRESS NOTES
Patient off unit in stable condition via wheelchair with volunteers for discharge home per  MD.  Patient is to follow up in 1 week for dressing removal and again in 6 weeks and is aware.  Patient denies H/A, dizziness, nausea and or vomiting or pain at this time. Infant in car seat with mom.

## 2024-11-24 NOTE — DISCHARGE INSTRUCTIONS
resolve.  Most women experience some form of the \"Baby Blues\" after having a baby.  Feeling emotional, tearful, frustrated, anxious, sad, and irritable some of the time is normal and go away after about 2 weeks.  Adequate rest and help from your family will help.  Take breaks from caring for the baby.  Call your doctor if your symptoms seem severe, last more than 2 weeks, or seem to be getting worse instead of better.  Get help immediately if you have thoughts of wanting to hurt yourself or others!      Call your doctor or seek immediate medical care if you have:  Heavy vaginal bleeding, soaking through one or more pads an hour for several hours.  Foul-smelling discharge from your vagina or incision.  Consistent nausea and vomiting and cannot keep fluids down.  Consistent pain that does not get better after you take pain medicine.  Sudden chest pain and shortness of breath  Signs of a blood clot: pain/ swelling/ increasing redness in your lower extremeties  Signs of infection: increased pain in your abdomen or vaginal area; red streaks, warmth, or tenderness of your breasts; fever of 100.5 F or greater

## 2024-11-25 LAB
ABO + RH BLD: NORMAL
BLD PROD TYP BPU: NORMAL
BLOOD BANK BLOOD PRODUCT EXPIRATION DATE: NORMAL
BLOOD BANK DISPENSE STATUS: NORMAL
BPU ID: NORMAL
FETAL SCREEN: NORMAL
RPR SER QL: NONREACTIVE
UNIT DIVISION: 0
UNIT ISSUE DATE/TIME: NORMAL

## 2024-11-25 NOTE — ANESTHESIA POSTPROCEDURE EVALUATION
Department of Anesthesiology  Postprocedure Note    Patient: Bindu Tarango  MRN: [id number]  YOB: 1995  Date of evaluation: 2024    Procedure Summary       Date: 24 Room / Location: [facility] L&D OR    Anesthesia Start: 1458 Anesthesia Stop: 1632    Procedure:  SECTION (Abdomen) Diagnosis:       Previous  delivery affecting pregnancy      (Previous  delivery affecting pregnancy [O34.219])    Surgeons: Cat Sidhu DO Responsible Provider: Kemal Hunt MD    Anesthesia Type: spinal ASA Status: 3            Anesthesia Type: No value filed.    Tereza Phase I:      Tereza Phase II:      Anesthesia Post Evaluation    No notable events documented.

## 2024-12-02 ENCOUNTER — HOSPITAL ENCOUNTER (OUTPATIENT)
Facility: HOSPITAL | Age: 29
Setting detail: OBSERVATION
LOS: 1 days | Discharge: HOME | End: 2024-12-05
Attending: STUDENT IN AN ORGANIZED HEALTH CARE EDUCATION/TRAINING PROGRAM | Admitting: OBSTETRICS & GYNECOLOGY
Payer: MEDICAID

## 2024-12-02 ENCOUNTER — APPOINTMENT (OUTPATIENT)
Facility: HOSPITAL | Age: 29
End: 2024-12-02
Attending: STUDENT IN AN ORGANIZED HEALTH CARE EDUCATION/TRAINING PROGRAM
Payer: MEDICAID

## 2024-12-02 PROCEDURE — 6360000002 HC RX W HCPCS: Performed by: OBSTETRICS & GYNECOLOGY

## 2024-12-02 PROCEDURE — 87186 SC STD MICRODIL/AGAR DIL: CPT

## 2024-12-02 PROCEDURE — 6360000004 HC RX CONTRAST MEDICATION: Performed by: STUDENT IN AN ORGANIZED HEALTH CARE EDUCATION/TRAINING PROGRAM

## 2024-12-02 PROCEDURE — 96375 TX/PRO/DX INJ NEW DRUG ADDON: CPT

## 2024-12-02 PROCEDURE — 6370000000 HC RX 637 (ALT 250 FOR IP): Performed by: OBSTETRICS & GYNECOLOGY

## 2024-12-02 PROCEDURE — 87077 CULTURE AEROBIC IDENTIFY: CPT

## 2024-12-02 PROCEDURE — G0378 HOSPITAL OBSERVATION PER HR: HCPCS

## 2024-12-02 PROCEDURE — 87070 CULTURE OTHR SPECIMN AEROBIC: CPT

## 2024-12-02 PROCEDURE — 74177 CT ABD & PELVIS W/CONTRAST: CPT

## 2024-12-02 PROCEDURE — 87205 SMEAR GRAM STAIN: CPT

## 2024-12-02 RX ORDER — KETOROLAC TROMETHAMINE 30 MG/ML
30 INJECTION, SOLUTION INTRAMUSCULAR; INTRAVENOUS EVERY 6 HOURS
Status: COMPLETED | OUTPATIENT
Start: 2024-12-02 | End: 2024-12-03

## 2024-12-02 RX ORDER — ONDANSETRON 2 MG/ML
4 INJECTION INTRAMUSCULAR; INTRAVENOUS EVERY 6 HOURS PRN
Status: DISCONTINUED | OUTPATIENT
Start: 2024-12-02 | End: 2024-12-05 | Stop reason: HOSPADM

## 2024-12-02 RX ORDER — CLINDAMYCIN PHOSPHATE 900 MG/50ML
900 INJECTION, SOLUTION INTRAVENOUS EVERY 8 HOURS
Status: DISCONTINUED | OUTPATIENT
Start: 2024-12-02 | End: 2024-12-05 | Stop reason: HOSPADM

## 2024-12-02 RX ORDER — IOPAMIDOL 755 MG/ML
100 INJECTION, SOLUTION INTRAVASCULAR
Status: COMPLETED | OUTPATIENT
Start: 2024-12-02 | End: 2024-12-02

## 2024-12-02 RX ORDER — IBUPROFEN 800 MG/1
800 TABLET, FILM COATED ORAL EVERY 8 HOURS
Status: DISCONTINUED | OUTPATIENT
Start: 2024-12-03 | End: 2024-12-05 | Stop reason: HOSPADM

## 2024-12-02 RX ORDER — DOCUSATE SODIUM 100 MG/1
100 CAPSULE, LIQUID FILLED ORAL 2 TIMES DAILY
Status: DISCONTINUED | OUTPATIENT
Start: 2024-12-02 | End: 2024-12-05 | Stop reason: HOSPADM

## 2024-12-02 RX ORDER — ACETAMINOPHEN 500 MG
1000 TABLET ORAL EVERY 8 HOURS SCHEDULED
Status: DISCONTINUED | OUTPATIENT
Start: 2024-12-02 | End: 2024-12-05 | Stop reason: HOSPADM

## 2024-12-02 RX ORDER — ONDANSETRON 4 MG/1
4 TABLET, ORALLY DISINTEGRATING ORAL EVERY 8 HOURS PRN
Status: DISCONTINUED | OUTPATIENT
Start: 2024-12-02 | End: 2024-12-05 | Stop reason: HOSPADM

## 2024-12-02 RX ORDER — OXYCODONE HYDROCHLORIDE 5 MG/1
5 TABLET ORAL EVERY 6 HOURS PRN
Status: DISCONTINUED | OUTPATIENT
Start: 2024-12-02 | End: 2024-12-05 | Stop reason: HOSPADM

## 2024-12-02 RX ADMIN — ACETAMINOPHEN 1000 MG: 500 TABLET ORAL at 22:09

## 2024-12-02 RX ADMIN — IOPAMIDOL 100 ML: 755 INJECTION, SOLUTION INTRAVENOUS at 21:54

## 2024-12-02 RX ADMIN — DOCUSATE SODIUM 100 MG: 100 CAPSULE, LIQUID FILLED ORAL at 22:09

## 2024-12-02 RX ADMIN — CLINDAMYCIN PHOSPHATE 900 MG: 900 INJECTION, SOLUTION INTRAVENOUS at 18:53

## 2024-12-02 RX ADMIN — OXYCODONE 5 MG: 5 TABLET ORAL at 18:53

## 2024-12-02 RX ADMIN — KETOROLAC TROMETHAMINE 30 MG: 30 INJECTION, SOLUTION INTRAMUSCULAR at 18:50

## 2024-12-02 ASSESSMENT — PAIN DESCRIPTION - LOCATION
LOCATION: ABDOMEN;INCISION
LOCATION: ABDOMEN;INCISION

## 2024-12-02 ASSESSMENT — PAIN SCALES - GENERAL
PAINLEVEL_OUTOF10: 7
PAINLEVEL_OUTOF10: 6
PAINLEVEL_OUTOF10: 7

## 2024-12-02 ASSESSMENT — PAIN DESCRIPTION - DESCRIPTORS
DESCRIPTORS: SORE;ACHING
DESCRIPTORS: ACHING;SORE
DESCRIPTORS: SORE

## 2024-12-02 ASSESSMENT — PAIN - FUNCTIONAL ASSESSMENT
PAIN_FUNCTIONAL_ASSESSMENT: PREVENTS OR INTERFERES SOME ACTIVE ACTIVITIES AND ADLS
PAIN_FUNCTIONAL_ASSESSMENT: ACTIVITIES ARE NOT PREVENTED

## 2024-12-02 ASSESSMENT — PAIN DESCRIPTION - ORIENTATION
ORIENTATION: LOWER

## 2024-12-03 LAB
BASOPHILS # BLD: 0.1 K/UL (ref 0–0.1)
BASOPHILS NFR BLD: 1 % (ref 0–1)
CREAT SERPL-MCNC: 0.66 MG/DL (ref 0.55–1.02)
DIFFERENTIAL METHOD BLD: ABNORMAL
EOSINOPHIL # BLD: 0.2 K/UL (ref 0–0.4)
EOSINOPHIL NFR BLD: 2 % (ref 0–7)
ERYTHROCYTE [DISTWIDTH] IN BLOOD BY AUTOMATED COUNT: 14 % (ref 11.5–14.5)
HCT VFR BLD AUTO: 34.5 % (ref 35–47)
HGB BLD-MCNC: 11.2 G/DL (ref 11.5–16)
IMM GRANULOCYTES # BLD AUTO: 0.1 K/UL (ref 0–0.04)
IMM GRANULOCYTES NFR BLD AUTO: 1 % (ref 0–0.5)
LYMPHOCYTES # BLD: 1.5 K/UL (ref 0.8–3.5)
LYMPHOCYTES NFR BLD: 14 % (ref 12–49)
MCH RBC QN AUTO: 26.6 PG (ref 26–34)
MCHC RBC AUTO-ENTMCNC: 32.5 G/DL (ref 30–36.5)
MCV RBC AUTO: 81.9 FL (ref 80–99)
MONOCYTES # BLD: 0.7 K/UL (ref 0–1)
MONOCYTES NFR BLD: 6 % (ref 5–13)
NEUTS SEG # BLD: 8.5 K/UL (ref 1.8–8)
NEUTS SEG NFR BLD: 76 % (ref 32–75)
NRBC # BLD: 0 K/UL (ref 0–0.01)
NRBC BLD-RTO: 0 PER 100 WBC
PLATELET # BLD AUTO: 330 K/UL (ref 150–400)
PMV BLD AUTO: 9.8 FL (ref 8.9–12.9)
RBC # BLD AUTO: 4.21 M/UL (ref 3.8–5.2)
WBC # BLD AUTO: 11 K/UL (ref 3.6–11)

## 2024-12-03 PROCEDURE — G0378 HOSPITAL OBSERVATION PER HR: HCPCS

## 2024-12-03 PROCEDURE — 6360000002 HC RX W HCPCS: Performed by: OBSTETRICS & GYNECOLOGY

## 2024-12-03 PROCEDURE — 82565 ASSAY OF CREATININE: CPT

## 2024-12-03 PROCEDURE — 36415 COLL VENOUS BLD VENIPUNCTURE: CPT

## 2024-12-03 PROCEDURE — 6370000000 HC RX 637 (ALT 250 FOR IP): Performed by: OBSTETRICS & GYNECOLOGY

## 2024-12-03 PROCEDURE — 6360000002 HC RX W HCPCS: Performed by: STUDENT IN AN ORGANIZED HEALTH CARE EDUCATION/TRAINING PROGRAM

## 2024-12-03 PROCEDURE — 96365 THER/PROPH/DIAG IV INF INIT: CPT

## 2024-12-03 PROCEDURE — 96366 THER/PROPH/DIAG IV INF ADDON: CPT

## 2024-12-03 PROCEDURE — 96376 TX/PRO/DX INJ SAME DRUG ADON: CPT

## 2024-12-03 PROCEDURE — 85025 COMPLETE CBC W/AUTO DIFF WBC: CPT

## 2024-12-03 RX ADMIN — CLINDAMYCIN PHOSPHATE 900 MG: 900 INJECTION, SOLUTION INTRAVENOUS at 02:27

## 2024-12-03 RX ADMIN — KETOROLAC TROMETHAMINE 30 MG: 30 INJECTION, SOLUTION INTRAMUSCULAR at 13:24

## 2024-12-03 RX ADMIN — ACETAMINOPHEN 1000 MG: 500 TABLET ORAL at 21:36

## 2024-12-03 RX ADMIN — KETOROLAC TROMETHAMINE 30 MG: 30 INJECTION, SOLUTION INTRAMUSCULAR at 00:50

## 2024-12-03 RX ADMIN — OXYCODONE 5 MG: 5 TABLET ORAL at 19:49

## 2024-12-03 RX ADMIN — DOCUSATE SODIUM 100 MG: 100 CAPSULE, LIQUID FILLED ORAL at 08:44

## 2024-12-03 RX ADMIN — ACETAMINOPHEN 1000 MG: 500 TABLET ORAL at 13:23

## 2024-12-03 RX ADMIN — OXYCODONE 5 MG: 5 TABLET ORAL at 08:44

## 2024-12-03 RX ADMIN — ACETAMINOPHEN 1000 MG: 500 TABLET ORAL at 06:07

## 2024-12-03 RX ADMIN — Medication 2500 MG: at 17:51

## 2024-12-03 RX ADMIN — CLINDAMYCIN PHOSPHATE 900 MG: 900 INJECTION, SOLUTION INTRAVENOUS at 22:06

## 2024-12-03 RX ADMIN — CLINDAMYCIN PHOSPHATE 900 MG: 900 INJECTION, SOLUTION INTRAVENOUS at 09:57

## 2024-12-03 RX ADMIN — KETOROLAC TROMETHAMINE 30 MG: 30 INJECTION, SOLUTION INTRAMUSCULAR at 06:08

## 2024-12-03 RX ADMIN — DOCUSATE SODIUM 100 MG: 100 CAPSULE, LIQUID FILLED ORAL at 19:43

## 2024-12-03 RX ADMIN — IBUPROFEN 800 MG: 800 TABLET, FILM COATED ORAL at 19:43

## 2024-12-03 ASSESSMENT — PAIN DESCRIPTION - LOCATION
LOCATION: ABDOMEN;INCISION
LOCATION: INCISION
LOCATION: ABDOMEN;INCISION

## 2024-12-03 ASSESSMENT — PAIN DESCRIPTION - ORIENTATION
ORIENTATION: LOWER
ORIENTATION: LOWER;ANTERIOR
ORIENTATION: ANTERIOR;LOWER
ORIENTATION: LOWER

## 2024-12-03 ASSESSMENT — PAIN SCALES - GENERAL
PAINLEVEL_OUTOF10: 5
PAINLEVEL_OUTOF10: 6
PAINLEVEL_OUTOF10: 6
PAINLEVEL_OUTOF10: 5

## 2024-12-03 ASSESSMENT — PAIN - FUNCTIONAL ASSESSMENT
PAIN_FUNCTIONAL_ASSESSMENT: ACTIVITIES ARE NOT PREVENTED

## 2024-12-03 ASSESSMENT — PAIN DESCRIPTION - DESCRIPTORS
DESCRIPTORS: SORE
DESCRIPTORS: ACHING;SORE
DESCRIPTORS: ACHING;SORE

## 2024-12-03 NOTE — H&P
Gynecology History and Physical    Name: Bindu Tarango MRN: [id number] SSN: [id number]    YOB: 1995  Age: 29 y.o.  Sex: female       Subjective:      Chief complaint:  Wound infection    Bindu is a 29 y.o.  female sp uncomplicated RLTCS  found to have post op wound infection. Was seen last week for ANGIE dressing removal and no issues noted at that time. Today patient started to pour a large amount of pus from her incision and so presented to this office. Reports pain is controlled w PO meds. Denies F,C.      OB History          3    Para   2    Term   2            AB   1    Living   2         SAB   1    IAB        Ectopic        Molar        Multiple   0    Live Births   2              Past Medical History:   Diagnosis Date    Bronchitis     Toe fracture, left      Past Surgical History:   Procedure Laterality Date     SECTION  2017     SECTION N/A 2024     SECTION performed by Cat Sidhu DO at [facility] L&D OR    TOE SURGERY Left     fracture repair    WRIST FRACTURE SURGERY Left      Social History     Occupational History    Not on file   Tobacco Use    Smoking status: Former     Types: Cigarettes    Smokeless tobacco: Current   Vaping Use    Vaping status: Former    Substances: Nicotine, THC   Substance and Sexual Activity    Alcohol use: Not Currently    Drug use: Not Currently     Types: Marijuana (Weed)    Sexual activity: Yes     Partners: Male     No family history on file.     Allergies   Allergen Reactions    Amoxicillin Anaphylaxis    Penicillins Anaphylaxis     Prior to Admission medications    Medication Sig Start Date End Date Taking? Authorizing Provider   Prenatal Vit-Fe Fumarate-FA (PRENATAL VITAMINS PO) Take by mouth  Patient not taking: Reported on 2024    Provider, MD Evelin   albuterol (PROVENTIL) (5 MG/ML) 0.5% nebulizer solution Inhale into the lungs once    Automatic Reconciliation, Ar   ondansetron

## 2024-12-03 NOTE — PROGRESS NOTES
[facility] Dosing Services: Antimicrobial Stewardship Daily Doc  Consult for antibiotic dosing of Vancomycin by Dr. Newsome  Indication: Skin and Soft Tissue Infection ( wound infection)  Day of Therapy: 1 of 5 days    Ht Readings from Last 1 Encounters:   24 1.676 m (5' 6\")        Wt Readings from Last 1 Encounters:   24 108.4 kg (239 lb)      Vancomycin therapy:  Loading dose: Vancomycin 2500 mg x1 dose given  @ 1751  Maintenance dose: Vancomycin 1250 mg IV every 8 hours  Dose calculated to approximate a           a. Target AUC/EUGENIO of 400-600          b. Trough of 15-20 mcg/mL   Last level:  mcg/mL    A/Plan:  [facility] daily  Will order scheduled vancomycin 1250 mg IV every 8 hours (per insight rx predicts , Tr 15.9, T1/2 = 7.65 hours)  Plan to order vancomycin level within 24 hours following administration of first maintenance dose    Dose administration notes: new start      Other Antimicrobial   (not dosed by pharmacist) Clindamycin 900 mg q8h   Cultures  Blood Cx: Heavy possible Staph aureus - prelim     Serum Creatinine Lab Results   Component Value Date/Time    CREATININE 0.66 2024 05:21 PM          Creatinine Clearance Estimated Creatinine Clearance: 158 mL/min (based on SCr of 0.66 mg/dL).     Temp Temp: 97.7 °F (36.5 °C) (Oral)       WBC Lab Results   Component Value Date/Time    WBC 11.0 2024 08:52 AM          Procalcitonin No results found for: \"PROCAL\"   For Antifungals, Metronidazole and Nafcillin: Lab Results   Component Value Date/Time    ALT 13 2024 11:44 AM    AST 15 2024 11:44 AM        Pharmacist: Aston Benz, [facility]  [phone]

## 2024-12-03 NOTE — CARE COORDINATION
12/3/2024  12:43 PM    Care Management Progress Note    Reason for Admission:    wound infection [O86.00]    Patient Admission Status: Observation    Transition of care plan:    CM met with MARY JO to complete initial assessment and begin discharge planning.  MOB verified and confirmed demographics.  MARY JO lives with family, at the address on file. MARY JO reports she has good family support, and feels like she has the support she needs when she returns home.      MARY JO confirmed pharmacy in chart correct and confirms she has no issues obtaining meds.    MARY JO is  s/p RLTCS on 24, readmitted with a wound infection/seroma. Notes that baby is doing well and is with family.         24 1241   Readmission Assessment   Number of Days since last admission? 8-30 days   Previous Disposition Home with Family   Who is being Interviewed Patient   What was the patient's/caregiver's perception as to why they think they needed to return back to the hospital? Did not realize care needs would be so extensive   Did you visit your Primary Care Physician after you left the hospital, before you returned this time? Yes   Why weren't you able to visit your PCP?   (Was not due to follow up yet)   Did you see a specialist, such as Cardiac, Pulmonary, Orthopedic Physician, etc. after you left the hospital? No   Who advised the patient to return to the hospital? Physician's Nurse/Office staff   Does the patient report anything that got in the way of taking their medications? No   In our efforts to provide the best possible care to you and others like you, can you think of anything that we could have done to help you after you left the hospital the first time, so that you might not have needed to return so soon? Other (Comment)  (nothing at this time)

## 2024-12-03 NOTE — PROGRESS NOTES
Hospital Note     Bindu Tarango  [id number]  1995  29 y.o.    S:  Ms. Bindu Tarango is a 29 y.o.  s/p RLTCS on 24, readmitted with a wound infection/seroma.   She is feeling well this morning. No fever or chills. Wound is draining yellow/brown fluid, no odor. Pain is well controlled. Appetite is good.       O:   /62   Pulse 79   Temp 97.5 °F (36.4 °C) (Oral)   Resp 15   Ht 1.676 m (5' 6\")   Wt 108.4 kg (239 lb)   LMP 2024 (Approximate)   SpO2 100%   BMI 38.58 kg/m²     Lab Results   Component Value Date/Time    WBC 10.4 2024 05:48 AM    HGB 11.5 2024 05:48 AM    HCT 34.9 2024 05:48 AM     2024 05:48 AM    MCV 81.4 2024 05:48 AM       Gen - No acute distress  Abdomen - Fundus firm, below the umbilicus, incision with slight erythema/pink discoloration of skin that does extend above the drawn margins on the right side, no warmth, drainage noted from two small areas on either side each open ~0.5cm, serous yellow/brown tinged drainage present, no odor. Her dressing was changed.   Ext - Warm, well perfused.  Nontender    CT Abdomen and Pelvis   REPRODUCTIVE ORGANS: Heterogeneous enhancement of the uterus interposed between  the uterus and the bladder is a heterogeneous oval structure that measures 8.4 x  4.2 x 9.4 cm (sagittal image 101 and coronal image 97).  URINARY BLADDER: No mass or calculus.  BONES: No destructive bone lesion.  ABDOMINAL WALL: Fat-containing umbilical hernia. Swelling of the rectus  musculature bilaterally with small intramuscular hematoma on the left. No  evidence for fascial dehiscence. Nonspecific abdominal wall soft tissue swelling  inferiorly.    IMPRESSION:  No fascial dehiscence.  Postoperative changes including small left rectus intramuscular hematoma.  Hematoma interposed between the uterus and bladder.    Wound culture -FEW GRAM POSITIVE COCCI IN PAIRS AND CLUSTERS   CBC -WBC 11, Hb 11.2    A/P:  30yo   s/p

## 2024-12-04 LAB
ANION GAP SERPL CALC-SCNC: 5 MMOL/L (ref 2–12)
BACTERIA SPEC CULT: ABNORMAL
BACTERIA SPEC CULT: ABNORMAL
BUN SERPL-MCNC: 12 MG/DL (ref 6–20)
BUN/CREAT SERPL: 18 (ref 12–20)
CALCIUM SERPL-MCNC: 8.9 MG/DL (ref 8.5–10.1)
CHLORIDE SERPL-SCNC: 107 MMOL/L (ref 97–108)
CO2 SERPL-SCNC: 26 MMOL/L (ref 21–32)
CREAT SERPL-MCNC: 0.65 MG/DL (ref 0.55–1.02)
GLUCOSE SERPL-MCNC: 122 MG/DL (ref 65–100)
GRAM STN SPEC: ABNORMAL
GRAM STN SPEC: ABNORMAL
POTASSIUM SERPL-SCNC: 3.6 MMOL/L (ref 3.5–5.1)
SERVICE CMNT-IMP: ABNORMAL
SODIUM SERPL-SCNC: 138 MMOL/L (ref 136–145)

## 2024-12-04 PROCEDURE — 96368 THER/DIAG CONCURRENT INF: CPT

## 2024-12-04 PROCEDURE — 96366 THER/PROPH/DIAG IV INF ADDON: CPT

## 2024-12-04 PROCEDURE — 2580000003 HC RX 258: Performed by: STUDENT IN AN ORGANIZED HEALTH CARE EDUCATION/TRAINING PROGRAM

## 2024-12-04 PROCEDURE — 36415 COLL VENOUS BLD VENIPUNCTURE: CPT

## 2024-12-04 PROCEDURE — 96376 TX/PRO/DX INJ SAME DRUG ADON: CPT

## 2024-12-04 PROCEDURE — 6360000002 HC RX W HCPCS: Performed by: OBSTETRICS & GYNECOLOGY

## 2024-12-04 PROCEDURE — G0378 HOSPITAL OBSERVATION PER HR: HCPCS

## 2024-12-04 PROCEDURE — 80048 BASIC METABOLIC PNL TOTAL CA: CPT

## 2024-12-04 PROCEDURE — 6370000000 HC RX 637 (ALT 250 FOR IP): Performed by: OBSTETRICS & GYNECOLOGY

## 2024-12-04 PROCEDURE — 6360000002 HC RX W HCPCS: Performed by: STUDENT IN AN ORGANIZED HEALTH CARE EDUCATION/TRAINING PROGRAM

## 2024-12-04 RX ADMIN — VANCOMYCIN HYDROCHLORIDE 1250 MG: 1.25 INJECTION, POWDER, LYOPHILIZED, FOR SOLUTION INTRAVENOUS at 10:55

## 2024-12-04 RX ADMIN — ACETAMINOPHEN 1000 MG: 500 TABLET ORAL at 10:53

## 2024-12-04 RX ADMIN — IBUPROFEN 800 MG: 800 TABLET, FILM COATED ORAL at 03:07

## 2024-12-04 RX ADMIN — ACETAMINOPHEN 1000 MG: 500 TABLET ORAL at 03:07

## 2024-12-04 RX ADMIN — OXYCODONE 5 MG: 5 TABLET ORAL at 01:17

## 2024-12-04 RX ADMIN — DOCUSATE SODIUM 100 MG: 100 CAPSULE, LIQUID FILLED ORAL at 08:08

## 2024-12-04 RX ADMIN — VANCOMYCIN HYDROCHLORIDE 1250 MG: 1.25 INJECTION, POWDER, LYOPHILIZED, FOR SOLUTION INTRAVENOUS at 03:20

## 2024-12-04 RX ADMIN — CLINDAMYCIN PHOSPHATE 900 MG: 900 INJECTION, SOLUTION INTRAVENOUS at 15:37

## 2024-12-04 RX ADMIN — IBUPROFEN 800 MG: 800 TABLET, FILM COATED ORAL at 10:53

## 2024-12-04 RX ADMIN — DOCUSATE SODIUM 100 MG: 100 CAPSULE, LIQUID FILLED ORAL at 19:33

## 2024-12-04 RX ADMIN — IBUPROFEN 800 MG: 800 TABLET, FILM COATED ORAL at 19:33

## 2024-12-04 RX ADMIN — CLINDAMYCIN PHOSPHATE 900 MG: 900 INJECTION, SOLUTION INTRAVENOUS at 06:15

## 2024-12-04 RX ADMIN — CLINDAMYCIN PHOSPHATE 900 MG: 900 INJECTION, SOLUTION INTRAVENOUS at 23:06

## 2024-12-04 RX ADMIN — OXYCODONE 5 MG: 5 TABLET ORAL at 22:27

## 2024-12-04 RX ADMIN — ACETAMINOPHEN 1000 MG: 500 TABLET ORAL at 19:33

## 2024-12-04 RX ADMIN — VANCOMYCIN HYDROCHLORIDE 1250 MG: 1.25 INJECTION, POWDER, LYOPHILIZED, FOR SOLUTION INTRAVENOUS at 21:08

## 2024-12-04 RX ADMIN — OXYCODONE 5 MG: 5 TABLET ORAL at 08:08

## 2024-12-04 ASSESSMENT — PAIN - FUNCTIONAL ASSESSMENT
PAIN_FUNCTIONAL_ASSESSMENT: ACTIVITIES ARE NOT PREVENTED

## 2024-12-04 ASSESSMENT — PAIN DESCRIPTION - ORIENTATION
ORIENTATION: ANTERIOR;LOWER
ORIENTATION: LOWER

## 2024-12-04 ASSESSMENT — PAIN DESCRIPTION - LOCATION
LOCATION: ABDOMEN;INCISION

## 2024-12-04 ASSESSMENT — PAIN SCALES - GENERAL
PAINLEVEL_OUTOF10: 4
PAINLEVEL_OUTOF10: 6
PAINLEVEL_OUTOF10: 4
PAINLEVEL_OUTOF10: 7
PAINLEVEL_OUTOF10: 6
PAINLEVEL_OUTOF10: 6

## 2024-12-04 ASSESSMENT — PAIN DESCRIPTION - DESCRIPTORS
DESCRIPTORS: DISCOMFORT
DESCRIPTORS: ACHING;SORE
DESCRIPTORS: ACHING
DESCRIPTORS: SORE
DESCRIPTORS: ACHING

## 2024-12-04 NOTE — PROGRESS NOTES
Progress Note    Bindu Tarango  [id number]  1995   38w3d      S:  Feeling \"fine\" today and wants to go home.  Incision still draining but is less.  No n/v.  Pain \"ok\".      O:    BP (!) 94/57   Pulse 70   Temp 97.5 °F (36.4 °C) (Oral)   Resp 16   Ht 1.676 m (5' 6\")   Wt 108.4 kg (239 lb)   LMP 2024 (Approximate)   SpO2 99%   BMI 38.58 kg/m²          Lab Results   Component Value Date    WBC 11.0 2024    HGB 11.2 (L) 2024    HCT 34.5 (L) 2024    MCV 81.9 2024     2024     Gen - NAD  Abd - incision with brawny edema.  Small areas of serosanguinous drainage. + Erythema.  No significant warmth.      A/P:  29 y.o.  @ 38w3d - Incisional drainage.      Wound cultures with heavy staph, but not yet finalized.   Changed to vanc/clinda last PM.    Plan for 48h IV abx and then dc home with PO.  She would like to go today.  Will reevaluate but likely home tomorrow.          Nicole Dennis MD  [facility]

## 2024-12-05 VITALS
DIASTOLIC BLOOD PRESSURE: 70 MMHG | SYSTOLIC BLOOD PRESSURE: 122 MMHG | WEIGHT: 239 LBS | OXYGEN SATURATION: 99 % | HEIGHT: 66 IN | RESPIRATION RATE: 20 BRPM | BODY MASS INDEX: 38.41 KG/M2 | HEART RATE: 85 BPM | TEMPERATURE: 97.9 F

## 2024-12-05 LAB
ANION GAP SERPL CALC-SCNC: 4 MMOL/L (ref 2–12)
BUN SERPL-MCNC: 9 MG/DL (ref 6–20)
BUN/CREAT SERPL: 15 (ref 12–20)
CALCIUM SERPL-MCNC: 8.8 MG/DL (ref 8.5–10.1)
CHLORIDE SERPL-SCNC: 109 MMOL/L (ref 97–108)
CO2 SERPL-SCNC: 26 MMOL/L (ref 21–32)
CREAT SERPL-MCNC: 0.6 MG/DL (ref 0.55–1.02)
GLUCOSE SERPL-MCNC: 87 MG/DL (ref 65–100)
POTASSIUM SERPL-SCNC: 3.8 MMOL/L (ref 3.5–5.1)
SODIUM SERPL-SCNC: 139 MMOL/L (ref 136–145)

## 2024-12-05 PROCEDURE — 6370000000 HC RX 637 (ALT 250 FOR IP): Performed by: OBSTETRICS & GYNECOLOGY

## 2024-12-05 PROCEDURE — 36415 COLL VENOUS BLD VENIPUNCTURE: CPT

## 2024-12-05 PROCEDURE — 2580000003 HC RX 258: Performed by: STUDENT IN AN ORGANIZED HEALTH CARE EDUCATION/TRAINING PROGRAM

## 2024-12-05 PROCEDURE — 6360000002 HC RX W HCPCS: Performed by: OBSTETRICS & GYNECOLOGY

## 2024-12-05 PROCEDURE — 96366 THER/PROPH/DIAG IV INF ADDON: CPT

## 2024-12-05 PROCEDURE — G0378 HOSPITAL OBSERVATION PER HR: HCPCS

## 2024-12-05 PROCEDURE — 80048 BASIC METABOLIC PNL TOTAL CA: CPT

## 2024-12-05 PROCEDURE — 6360000002 HC RX W HCPCS: Performed by: STUDENT IN AN ORGANIZED HEALTH CARE EDUCATION/TRAINING PROGRAM

## 2024-12-05 RX ORDER — SULFAMETHOXAZOLE AND TRIMETHOPRIM 800; 160 MG/1; MG/1
1 TABLET ORAL 2 TIMES DAILY
Qty: 20 TABLET | Refills: 0 | Status: SHIPPED | OUTPATIENT
Start: 2024-12-05 | End: 2024-12-15

## 2024-12-05 RX ADMIN — IBUPROFEN 800 MG: 800 TABLET, FILM COATED ORAL at 05:25

## 2024-12-05 RX ADMIN — CLINDAMYCIN PHOSPHATE 900 MG: 900 INJECTION, SOLUTION INTRAVENOUS at 08:35

## 2024-12-05 RX ADMIN — VANCOMYCIN HYDROCHLORIDE 1250 MG: 1.25 INJECTION, POWDER, LYOPHILIZED, FOR SOLUTION INTRAVENOUS at 05:27

## 2024-12-05 RX ADMIN — ACETAMINOPHEN 1000 MG: 500 TABLET ORAL at 05:25

## 2024-12-05 ASSESSMENT — PAIN DESCRIPTION - LOCATION: LOCATION: ABDOMEN;INCISION

## 2024-12-05 ASSESSMENT — PAIN DESCRIPTION - ORIENTATION: ORIENTATION: LOWER

## 2024-12-05 ASSESSMENT — PAIN - FUNCTIONAL ASSESSMENT: PAIN_FUNCTIONAL_ASSESSMENT: ACTIVITIES ARE NOT PREVENTED

## 2024-12-05 ASSESSMENT — PAIN DESCRIPTION - DESCRIPTORS: DESCRIPTORS: DISCOMFORT

## 2024-12-05 NOTE — PROGRESS NOTES
Progress Note    Bindu Tarango  [id number]  1995   38w3d      S:  Feeling \"fine\" today and wants to go home.  Incision still draining but is less.  No n/v.  Pain \"ok\".      O:    /68   Pulse 70   Temp 97.9 °F (36.6 °C)   Resp 16   Ht 1.676 m (5' 6\")   Wt 108.4 kg (239 lb)   LMP 2024 (Approximate)   SpO2 99%   BMI 38.58 kg/m²          Lab Results   Component Value Date    WBC 11.0 2024    HGB 11.2 (L) 2024    HCT 34.5 (L) 2024    MCV 81.9 2024     2024     Gen - NAD  Abd - incision with brawny edema.  Small areas of serosanguinous drainage. + Erythema.  No significant warmth.      A/P:  29 y.o.  s/p C/S on 24- Incisional drainage.      Wound cultures with heavy staph, but not yet finalized.   Has received 48hrs of ABX (Vanc/clinda)    D/C home today on Bactrim       Saniya Newsome,   [facility]

## (undated) DEVICE — BLADE CLIPPER GEN PURP NS

## (undated) DEVICE — Z DISCONTINUED USE 2753200 SUTURE VCRL SZ 3-0 L36IN ABSRB VLT CT L40MM 1/2 CIR J356H

## (undated) DEVICE — GARMENT,MEDLINE,DVT,INT,CALF,MED, GEN2: Brand: MEDLINE

## (undated) DEVICE — STERILE POLYISOPRENE POWDER-FREE SURGICAL GLOVES: Brand: PROTEXIS

## (undated) DEVICE — TOWEL,OR,DSP,ST,BLUE,STD,2/PK,40PK/CS: Brand: MEDLINE

## (undated) DEVICE — SOLUTION IV 1000ML 0.9% SOD CHL

## (undated) DEVICE — STERILE POLYISOPRENE POWDER-FREE SURGICAL GLOVES WITH EMOLLIENT COATING: Brand: PROTEXIS

## (undated) DEVICE — SPONGE: LAP 18X18 W  200/CS: Brand: MEDICAL ACTION INDUSTRIES

## (undated) DEVICE — Z INACTIVE NO ACTIVE SUPPLIER APPLICATOR MEDICATED 26 CC TINT HI-LITE ORNG STRL CHLORAPREP

## (undated) DEVICE — C-SECTION II-LF: Brand: MEDLINE INDUSTRIES, INC.

## (undated) DEVICE — CLEANER ES TIP W2XL2IN ADH BK RADPQ FOR S STL ELECTRD

## (undated) DEVICE — SUTURE MONOCRYL SZ 4 0 L18IN ABSRB VLT PS 1 L24MM 3 8 CIR REV Y682H

## (undated) DEVICE — MASTISOL ADHESIVE LIQ 2/3ML

## (undated) DEVICE — Z DISCONTINUED USE 2854405 SUTURE VCRL SZ 2-0 L27IN ABSRB VLT L40MM CT 1/2 CIR J351H

## (undated) DEVICE — TRAY,URINE METER,100% SILICONE,16FR10ML: Brand: MEDLINE

## (undated) DEVICE — SUTURE MONOCRYL SZ 0 L36IN ABSRB UD L36MM CT-1 1/2 CIR Y946H

## (undated) DEVICE — PAD,ABDOMINAL,5"X9",ST,LF,25/BX: Brand: MEDLINE INDUSTRIES, INC.

## (undated) DEVICE — Z DUP USE 2271313 SOLIDIFIER FLUID 3000 CC ABSORB

## (undated) DEVICE — 3M™ MEDIPORE™ H SOFT CLOTH SURGICAL TAPE, 2863, 3 IN X 10 YD, 12/CASE: Brand: 3M™ MEDIPORE™

## (undated) DEVICE — STRIP,CLOSURE,WOUND,MEDI-STRIP,1/2X4: Brand: MEDLINE

## (undated) DEVICE — 3000CC GUARDIAN II: Brand: GUARDIAN

## (undated) DEVICE — SUTURE VICRYL ABSRB BRAID COAT UD CT NO 1 36IN  J959H

## (undated) DEVICE — ELECTRODE PT RET AD L9FT HI MOIST COND ADH HYDRGEL CORDED

## (undated) DEVICE — SYRINGE IRRIG 60ML SFT PLIABLE BLB EZ TO GRP 1 HND USE W/

## (undated) DEVICE — PENCIL ES L3M ROCK SWCH S STL HEX LOK BLDE ELECTRD HOLSTER

## (undated) DEVICE — INTENT OT USE PROVIDES A STERILE INTERFACE BETWEEN THE OPERATING ROOM SURGICAL LAMPS (NON-STERILE) AND THE SURGEON OR STAFF WORKING IN THE STERILE FIELD.: Brand: ASPEN® ALC PLUS LIGHT HANDLE COVER

## (undated) DEVICE — Z DISCONTINUED USE 2220179 SUTURE VCRL SZ 0 L36IN ABSRB VLT L40MM CT 1/2 CIR J358H